# Patient Record
Sex: MALE | Race: WHITE | NOT HISPANIC OR LATINO | Employment: OTHER | URBAN - NONMETROPOLITAN AREA
[De-identification: names, ages, dates, MRNs, and addresses within clinical notes are randomized per-mention and may not be internally consistent; named-entity substitution may affect disease eponyms.]

---

## 2019-06-10 ENCOUNTER — HOSPITAL ENCOUNTER (INPATIENT)
Facility: HOSPITAL | Age: 79
LOS: 4 days | Discharge: HOSPICE | End: 2019-06-14
Attending: INTERNAL MEDICINE | Admitting: INTERNAL MEDICINE

## 2019-06-10 ENCOUNTER — APPOINTMENT (OUTPATIENT)
Dept: CT IMAGING | Facility: HOSPITAL | Age: 79
End: 2019-06-10

## 2019-06-10 DIAGNOSIS — R77.8 ELEVATED TROPONIN: ICD-10-CM

## 2019-06-10 DIAGNOSIS — J18.9 PNEUMONIA OF RIGHT LOWER LOBE DUE TO INFECTIOUS ORGANISM: Primary | ICD-10-CM

## 2019-06-10 DIAGNOSIS — R91.8 LUNG MASS: ICD-10-CM

## 2019-06-10 PROBLEM — E46 MALNUTRITION (HCC): Status: ACTIVE | Noted: 2019-06-10

## 2019-06-10 PROBLEM — C34.90 LUNG CANCER (HCC): Status: ACTIVE | Noted: 2019-06-10

## 2019-06-10 PROBLEM — R63.4 WEIGHT LOSS: Status: ACTIVE | Noted: 2019-06-10

## 2019-06-10 PROBLEM — F17.200 TOBACCO DEPENDENCE: Status: ACTIVE | Noted: 2019-06-10

## 2019-06-10 PROBLEM — E11.9 TYPE 2 DIABETES MELLITUS (HCC): Status: ACTIVE | Noted: 2019-06-10

## 2019-06-10 LAB
ALBUMIN SERPL-MCNC: 3.6 G/DL (ref 3.5–5)
ALBUMIN/GLOB SERPL: 1 G/DL (ref 1.1–2.5)
ALP SERPL-CCNC: 85 U/L (ref 24–120)
ALT SERPL W P-5'-P-CCNC: 25 U/L (ref 0–54)
ANION GAP SERPL CALCULATED.3IONS-SCNC: 9 MMOL/L (ref 4–13)
APTT PPP: 34.8 SECONDS (ref 24.1–35)
AST SERPL-CCNC: 47 U/L (ref 7–45)
BASOPHILS # BLD AUTO: 0.06 10*3/MM3 (ref 0–0.2)
BASOPHILS NFR BLD AUTO: 0.5 % (ref 0–2)
BILIRUB SERPL-MCNC: 1.4 MG/DL (ref 0.1–1)
BUN BLD-MCNC: 38 MG/DL (ref 5–21)
BUN/CREAT SERPL: 34.2 (ref 7–25)
CALCIUM SPEC-SCNC: 9.3 MG/DL (ref 8.4–10.4)
CHLORIDE SERPL-SCNC: 104 MMOL/L (ref 98–110)
CO2 SERPL-SCNC: 28 MMOL/L (ref 24–31)
CREAT BLD-MCNC: 1.11 MG/DL (ref 0.5–1.4)
DEPRECATED RDW RBC AUTO: 54.9 FL (ref 40–54)
EOSINOPHIL # BLD AUTO: 0.18 10*3/MM3 (ref 0–0.7)
EOSINOPHIL NFR BLD AUTO: 1.6 % (ref 0–4)
ERYTHROCYTE [DISTWIDTH] IN BLOOD BY AUTOMATED COUNT: 15.9 % (ref 12–15)
GFR SERPL CREATININE-BSD FRML MDRD: 64 ML/MIN/1.73
GLOBULIN UR ELPH-MCNC: 3.7 GM/DL
GLUCOSE BLD-MCNC: 118 MG/DL (ref 70–100)
GLUCOSE BLDC GLUCOMTR-MCNC: 183 MG/DL (ref 70–130)
GLUCOSE BLDC GLUCOMTR-MCNC: 186 MG/DL (ref 70–130)
HCT VFR BLD AUTO: 39.1 % (ref 40–52)
HGB BLD-MCNC: 12.6 G/DL (ref 14–18)
HOLD SPECIMEN: NORMAL
HOLD SPECIMEN: NORMAL
IMM GRANULOCYTES # BLD AUTO: 0.08 10*3/MM3 (ref 0–0.05)
IMM GRANULOCYTES NFR BLD AUTO: 0.7 % (ref 0–5)
INR PPP: 1.09 (ref 0.91–1.09)
LYMPHOCYTES # BLD AUTO: 1 10*3/MM3 (ref 0.72–4.86)
LYMPHOCYTES NFR BLD AUTO: 9.1 % (ref 15–45)
MCH RBC QN AUTO: 30.4 PG (ref 28–32)
MCHC RBC AUTO-ENTMCNC: 32.2 G/DL (ref 33–36)
MCV RBC AUTO: 94.2 FL (ref 82–95)
MONOCYTES # BLD AUTO: 0.93 10*3/MM3 (ref 0.19–1.3)
MONOCYTES NFR BLD AUTO: 8.5 % (ref 4–12)
NEUTROPHILS # BLD AUTO: 8.72 10*3/MM3 (ref 1.87–8.4)
NEUTROPHILS NFR BLD AUTO: 79.6 % (ref 39–78)
NRBC BLD AUTO-RTO: 0 /100 WBC (ref 0–0.2)
NT-PROBNP SERPL-MCNC: 1320 PG/ML (ref 0–1800)
PLATELET # BLD AUTO: 134 10*3/MM3 (ref 130–400)
PMV BLD AUTO: 12.3 FL (ref 6–12)
POTASSIUM BLD-SCNC: 4.1 MMOL/L (ref 3.5–5.3)
PROT SERPL-MCNC: 7.3 G/DL (ref 6.3–8.7)
PROTHROMBIN TIME: 14.5 SECONDS (ref 11.9–14.6)
RBC # BLD AUTO: 4.15 10*6/MM3 (ref 4.8–5.9)
SODIUM BLD-SCNC: 141 MMOL/L (ref 135–145)
TROPONIN I SERPL-MCNC: 0.4 NG/ML (ref 0–0.03)
TROPONIN I SERPL-MCNC: 0.46 NG/ML (ref 0–0.03)
WBC NRBC COR # BLD: 10.97 10*3/MM3 (ref 4.8–10.8)
WHOLE BLOOD HOLD SPECIMEN: NORMAL
WHOLE BLOOD HOLD SPECIMEN: NORMAL

## 2019-06-10 PROCEDURE — 94760 N-INVAS EAR/PLS OXIMETRY 1: CPT

## 2019-06-10 PROCEDURE — 25010000002 ENOXAPARIN PER 10 MG: Performed by: INTERNAL MEDICINE

## 2019-06-10 PROCEDURE — 93005 ELECTROCARDIOGRAM TRACING: CPT | Performed by: INTERNAL MEDICINE

## 2019-06-10 PROCEDURE — 63710000001 INSULIN LISPRO (HUMAN) PER 5 UNITS: Performed by: INTERNAL MEDICINE

## 2019-06-10 PROCEDURE — 85610 PROTHROMBIN TIME: CPT | Performed by: PHYSICIAN ASSISTANT

## 2019-06-10 PROCEDURE — 87040 BLOOD CULTURE FOR BACTERIA: CPT | Performed by: INTERNAL MEDICINE

## 2019-06-10 PROCEDURE — 82962 GLUCOSE BLOOD TEST: CPT

## 2019-06-10 PROCEDURE — 85025 COMPLETE CBC W/AUTO DIFF WBC: CPT | Performed by: PHYSICIAN ASSISTANT

## 2019-06-10 PROCEDURE — 85730 THROMBOPLASTIN TIME PARTIAL: CPT | Performed by: PHYSICIAN ASSISTANT

## 2019-06-10 PROCEDURE — 71275 CT ANGIOGRAPHY CHEST: CPT

## 2019-06-10 PROCEDURE — 94799 UNLISTED PULMONARY SVC/PX: CPT

## 2019-06-10 PROCEDURE — 0 IOPAMIDOL PER 1 ML: Performed by: PHYSICIAN ASSISTANT

## 2019-06-10 PROCEDURE — 25010000002 AZITHROMYCIN PER 500 MG: Performed by: EMERGENCY MEDICINE

## 2019-06-10 PROCEDURE — 94640 AIRWAY INHALATION TREATMENT: CPT

## 2019-06-10 PROCEDURE — 25010000002 CEFTRIAXONE PER 250 MG: Performed by: EMERGENCY MEDICINE

## 2019-06-10 PROCEDURE — 83880 ASSAY OF NATRIURETIC PEPTIDE: CPT | Performed by: PHYSICIAN ASSISTANT

## 2019-06-10 PROCEDURE — 93005 ELECTROCARDIOGRAM TRACING: CPT

## 2019-06-10 PROCEDURE — 99284 EMERGENCY DEPT VISIT MOD MDM: CPT

## 2019-06-10 PROCEDURE — 84484 ASSAY OF TROPONIN QUANT: CPT

## 2019-06-10 PROCEDURE — 80053 COMPREHEN METABOLIC PANEL: CPT | Performed by: PHYSICIAN ASSISTANT

## 2019-06-10 PROCEDURE — 93010 ELECTROCARDIOGRAM REPORT: CPT | Performed by: INTERNAL MEDICINE

## 2019-06-10 PROCEDURE — 93005 ELECTROCARDIOGRAM TRACING: CPT | Performed by: PHYSICIAN ASSISTANT

## 2019-06-10 PROCEDURE — 84484 ASSAY OF TROPONIN QUANT: CPT | Performed by: INTERNAL MEDICINE

## 2019-06-10 RX ORDER — CARVEDILOL 6.25 MG/1
12.5 TABLET ORAL 2 TIMES DAILY WITH MEALS
Status: DISCONTINUED | OUTPATIENT
Start: 2019-06-10 | End: 2019-06-10

## 2019-06-10 RX ORDER — LEVOTHYROXINE SODIUM 0.07 MG/1
75 TABLET ORAL
Status: DISCONTINUED | OUTPATIENT
Start: 2019-06-11 | End: 2019-06-14 | Stop reason: HOSPADM

## 2019-06-10 RX ORDER — DEXTROSE MONOHYDRATE 25 G/50ML
25 INJECTION, SOLUTION INTRAVENOUS
Status: DISCONTINUED | OUTPATIENT
Start: 2019-06-10 | End: 2019-06-14 | Stop reason: HOSPADM

## 2019-06-10 RX ORDER — ONDANSETRON 4 MG/1
4 TABLET, FILM COATED ORAL EVERY 6 HOURS PRN
Status: DISCONTINUED | OUTPATIENT
Start: 2019-06-10 | End: 2019-06-14 | Stop reason: HOSPADM

## 2019-06-10 RX ORDER — SODIUM CHLORIDE 0.9 % (FLUSH) 0.9 %
3 SYRINGE (ML) INJECTION EVERY 12 HOURS SCHEDULED
Status: DISCONTINUED | OUTPATIENT
Start: 2019-06-10 | End: 2019-06-14 | Stop reason: HOSPADM

## 2019-06-10 RX ORDER — ATORVASTATIN CALCIUM 80 MG/1
80 TABLET, FILM COATED ORAL DAILY
COMMUNITY
End: 2019-06-14 | Stop reason: HOSPADM

## 2019-06-10 RX ORDER — ALLOPURINOL 300 MG/1
300 TABLET ORAL DAILY
COMMUNITY
End: 2019-06-14 | Stop reason: HOSPADM

## 2019-06-10 RX ORDER — SODIUM CHLORIDE 0.9 % (FLUSH) 0.9 %
3-10 SYRINGE (ML) INJECTION AS NEEDED
Status: DISCONTINUED | OUTPATIENT
Start: 2019-06-10 | End: 2019-06-14 | Stop reason: HOSPADM

## 2019-06-10 RX ORDER — IPRATROPIUM BROMIDE AND ALBUTEROL SULFATE 2.5; .5 MG/3ML; MG/3ML
3 SOLUTION RESPIRATORY (INHALATION)
Status: DISCONTINUED | OUTPATIENT
Start: 2019-06-10 | End: 2019-06-14 | Stop reason: HOSPADM

## 2019-06-10 RX ORDER — ACETAMINOPHEN 325 MG/1
650 TABLET ORAL EVERY 4 HOURS PRN
Status: DISCONTINUED | OUTPATIENT
Start: 2019-06-10 | End: 2019-06-14 | Stop reason: HOSPADM

## 2019-06-10 RX ORDER — LEVOTHYROXINE SODIUM 0.07 MG/1
75 TABLET ORAL DAILY
COMMUNITY

## 2019-06-10 RX ORDER — ONDANSETRON 2 MG/ML
4 INJECTION INTRAMUSCULAR; INTRAVENOUS EVERY 6 HOURS PRN
Status: DISCONTINUED | OUTPATIENT
Start: 2019-06-10 | End: 2019-06-14 | Stop reason: HOSPADM

## 2019-06-10 RX ORDER — CARVEDILOL 12.5 MG/1
12.5 TABLET ORAL 2 TIMES DAILY WITH MEALS
COMMUNITY

## 2019-06-10 RX ORDER — CARVEDILOL 6.25 MG/1
12.5 TABLET ORAL 2 TIMES DAILY WITH MEALS
Status: DISCONTINUED | OUTPATIENT
Start: 2019-06-10 | End: 2019-06-14 | Stop reason: HOSPADM

## 2019-06-10 RX ORDER — NICOTINE POLACRILEX 4 MG
15 LOZENGE BUCCAL
Status: DISCONTINUED | OUTPATIENT
Start: 2019-06-10 | End: 2019-06-14 | Stop reason: HOSPADM

## 2019-06-10 RX ADMIN — IOPAMIDOL 100 ML: 755 INJECTION, SOLUTION INTRAVENOUS at 15:25

## 2019-06-10 RX ADMIN — ENOXAPARIN SODIUM 40 MG: 100 INJECTION SUBCUTANEOUS at 20:54

## 2019-06-10 RX ADMIN — IPRATROPIUM BROMIDE AND ALBUTEROL SULFATE 3 ML: 2.5; .5 SOLUTION RESPIRATORY (INHALATION) at 21:29

## 2019-06-10 RX ADMIN — AZITHROMYCIN MONOHYDRATE 500 MG: 500 INJECTION, POWDER, LYOPHILIZED, FOR SOLUTION INTRAVENOUS at 16:25

## 2019-06-10 RX ADMIN — INSULIN LISPRO 2 UNITS: 100 INJECTION, SOLUTION INTRAVENOUS; SUBCUTANEOUS at 21:00

## 2019-06-10 RX ADMIN — CARVEDILOL 12.5 MG: 6.25 TABLET, FILM COATED ORAL at 17:09

## 2019-06-10 RX ADMIN — SODIUM CHLORIDE 1 G: 900 INJECTION INTRAVENOUS at 16:06

## 2019-06-10 RX ADMIN — SODIUM CHLORIDE, PRESERVATIVE FREE 3 ML: 5 INJECTION INTRAVENOUS at 20:55

## 2019-06-10 NOTE — H&P
[facility] Medicine Services  HISTORY AND PHYSICAL    Date of Admission: 6/10/2019  Primary Care Physician: Shane Hsu MD    Subjective     Chief Complaint: weakness, shortness of breath    History of Present Illness    Mr. Becerra is a 77 yo M with a past medical history of emphysema, DM, hypothyroidism, and a history of hypertension.  Patient has had over the last 3 weeks worsening chest pressure and loss of 9 lbs.  Patient has lost >15 lbs over the last 2-3 months.  Patient reports over the last week he has had nausea, vomiting, and early satiety and decrease oral intake.  Patient had a CT scan on 6/4 from his primary care doctor that show concern for right-lower lobe malignancy (daughter has the CD with images) and concern for adrenal metastasis and hilar lymph node metastasis.    Patient has developed worsening dyspnea, shortness of breath, and fatigue recently.  He previously was able to perform most of his ADLs and function quite well but since the symptoms started to worsen 3 weeks ago he cannot ambulate 100 feet without feeling short of breath.    Patient has a >60 pack-year smoking history.         Review of Systems   Constitutional: Positive for activity change, appetite change, fatigue and unexpected weight change. Negative for chills, diaphoresis and fever.   Respiratory: Positive for shortness of breath. Negative for cough.    Cardiovascular: Negative for chest pain and palpitations.   Gastrointestinal: Negative for abdominal distention, abdominal pain, constipation, diarrhea, nausea and vomiting.   Neurological: Positive for weakness.   All other systems reviewed and are negative.       Otherwise complete ROS reviewed and negative except as mentioned in the HPI.    Past Medical History:   Past Medical History:   Diagnosis Date   • Diabetes (CMS/HCC)    • Hypertension    • Thyroid disease      Past Surgical History: Cerebral angiogram  Social History:  reports  "that he quit smoking about 4 weeks ago. His smoking use included cigarettes. He quit after 65.00 years of use. He has never used smokeless tobacco. He reports that he does not drink alcohol or use drugs.    Family History: family history includes COPD in his brother and father; Cancer in his father; Heart disease in his mother.    Allergies:  Allergies   Allergen Reactions   • Penicillins Swelling     Medications:  Prior to Admission medications    Medication Sig Start Date End Date Taking? Authorizing Provider   allopurinol (ZYLOPRIM) 300 MG tablet Take 300 mg by mouth Daily.   Yes Pablo Aldana MD   atorvastatin (LIPITOR) 80 MG tablet Take 80 mg by mouth Daily.   Yes Pablo Aldana MD   carvedilol (COREG) 12.5 MG tablet Take 12.5 mg by mouth 2 (Two) Times a Day With Meals.   Yes Pablo Aldana MD   levothyroxine (SYNTHROID, LEVOTHROID) 75 MCG tablet Take 75 mcg by mouth Daily.   Yes Pablo Aldana MD   metFORMIN (GLUCOPHAGE) 500 MG tablet Take 500 mg by mouth 2 (Two) Times a Day With Meals.   Yes Pablo Aldana MD     Objective     Vital Signs: /95   Pulse 95   Temp 98.3 °F (36.8 °C) (Oral)   Resp 25   Ht 182.9 cm (72\")   Wt 73.3 kg (161 lb 9.6 oz)   SpO2 90%   BMI 21.92 kg/m²   Physical Exam   Constitutional: He is oriented to person, place, and time. No distress.   HENT:   Head: Normocephalic and atraumatic.   Eyes: Conjunctivae are normal. No scleral icterus.   Neck: Neck supple. No JVD present.   Cardiovascular: Normal rate and regular rhythm.   Murmur heard.  Pulmonary/Chest: Effort normal. He has wheezes.   Right sided rhonchi; diminished at bilateral bases   Abdominal: Soft. Bowel sounds are normal. He exhibits no distension and no mass. There is no tenderness. There is no guarding.   Musculoskeletal: He exhibits no edema.   Neurological: He is alert and oriented to person, place, and time.   Skin: Skin is warm and dry. He is not diaphoretic. No erythema. "   Psychiatric: He has a normal mood and affect. His behavior is normal.   Nursing note and vitals reviewed.          Results Reviewed:  Lab Results (last 24 hours)     Procedure Component Value Units Date/Time    Blood Culture With WILLARD - Blood, Arm, Right [[id number]] Collected:  06/10/19 1625    Specimen:  Blood from Arm, Right Updated:  06/10/19 1652    Blood Culture With WILLARD - Blood, Arm, Left [[id number]] Collected:  06/10/19 1623    Specimen:  Blood from Arm, Left Updated:  06/10/19 1652    Comprehensive Metabolic Panel [[id number]]  (Abnormal) Collected:  06/10/19 1221    Specimen:  Blood Updated:  06/10/19 1417     Glucose 118 mg/dL      BUN 38 mg/dL      Creatinine 1.11 mg/dL      Sodium 141 mmol/L      Potassium 4.1 mmol/L      Chloride 104 mmol/L      CO2 28.0 mmol/L      Calcium 9.3 mg/dL      Total Protein 7.3 g/dL      Albumin 3.60 g/dL      ALT (SGPT) 25 U/L      AST (SGOT) 47 U/L      Alkaline Phosphatase 85 U/L      Total Bilirubin 1.4 mg/dL      eGFR Non African Amer 64 mL/min/1.73      Globulin 3.7 gm/dL      A/G Ratio 1.0 g/dL      BUN/Creatinine Ratio 34.2     Anion Gap 9.0 mmol/L     Narrative:       GFR Normal >60  Chronic Kidney Disease <60  Kidney Failure <15    CBC & Differential [[id number]] Collected:  06/10/19 1221    Specimen:  Blood Updated:  06/10/19 1356    Narrative:       The following orders were created for panel order CBC & Differential.  Procedure                               Abnormality         Status                     ---------                               -----------         ------                     CBC Auto Differential[[id number]]        Abnormal            Final result                 Please view results for these tests on the individual orders.    CBC Auto Differential [[id number]]  (Abnormal) Collected:  06/10/19 1221    Specimen:  Blood Updated:  06/10/19 1356     WBC 10.97 10*3/mm3      RBC 4.15 10*6/mm3      Hemoglobin 12.6 g/dL      Hematocrit 39.1 %      MCV 94.2 fL       MCH 30.4 pg      MCHC 32.2 g/dL      RDW 15.9 %      RDW-SD 54.9 fl      MPV 12.3 fL      Platelets 134 10*3/mm3      Neutrophil % 79.6 %      Lymphocyte % 9.1 %      Monocyte % 8.5 %      Eosinophil % 1.6 %      Basophil % 0.5 %      Immature Grans % 0.7 %      Neutrophils, Absolute 8.72 10*3/mm3      Lymphocytes, Absolute 1.00 10*3/mm3      Monocytes, Absolute 0.93 10*3/mm3      Eosinophils, Absolute 0.18 10*3/mm3      Basophils, Absolute 0.06 10*3/mm3      Immature Grans, Absolute 0.08 10*3/mm3      nRBC 0.0 /100 WBC     BNP [[id number]]  (Normal) Collected:  06/10/19 1221    Specimen:  Blood Updated:  06/10/19 1336     proBNP 1,320.0 pg/mL     Protime-INR [[id number]]  (Normal) Collected:  06/10/19 1221    Specimen:  Blood Updated:  06/10/19 1336     Protime 14.5 Seconds      INR 1.09    aPTT [[id number]]  (Normal) Collected:  06/10/19 1221    Specimen:  Blood Updated:  06/10/19 1336     PTT 34.8 seconds     [facility] Draw [[id number]] Collected:  06/10/19 1221    Specimen:  Blood Updated:  06/10/19 1331    Narrative:       The following orders were created for panel order [facility] Draw.  Procedure                               Abnormality         Status                     ---------                               -----------         ------                     Light Blue Top[[id number]]                                   Final result               Green Top (Gel)[[id number]]                                  Final result               Lavender Top[[id number]]                                     Final result               Red Top[[id number]]                                          Final result                 Please view results for these tests on the individual orders.    Light Blue Top [[id number]] Collected:  06/10/19 1221    Specimen:  Blood Updated:  06/10/19 1331     Extra Tube hold for add-on     Comment: Auto resulted       Green Top (Gel) [[id number]] Collected:  06/10/19 1221    Specimen:  Blood Updated:   06/10/19 1331     Extra Tube Hold for add-ons.     Comment: Auto resulted.       Lavender Top [[id number]] Collected:  06/10/19 1221    Specimen:  Blood Updated:  06/10/19 1331     Extra Tube hold for add-on     Comment: Auto resulted       Red Top [[id number]] Collected:  06/10/19 1221    Specimen:  Blood Updated:  06/10/19 1331     Extra Tube Hold for add-ons.     Comment: Auto resulted.       Troponin [[id number]]  (Abnormal) Collected:  06/10/19 1221    Specimen:  Blood Updated:  06/10/19 1303     Troponin I 0.402 ng/mL         Imaging Results (last 24 hours)     Procedure Component Value Units Date/Time    CT Angiogram Chest With Contrast [[id number]] Collected:  06/10/19 1537     Updated:  06/10/19 1547    Narrative:       CT ANGIOGRAM CHEST W CONTRAST- 6/10/2019 3:24 PM CDT     HISTORY: soa, cough, hx of ca, elevated troponin      COMPARISON: None     DOSE LENGTH PRODUCT: 318 mGy cm. Automated exposure control was also  utilized to decrease patient radiation dose.     TECHNIQUE: Axial images the chest are obtained following IV contrast.  2-D and maximal intensity projection images are reconstructed and  reviewed.     FINDINGS:  No pulmonary emboli are identified. There is scattered  calcification of the normal caliber thoracic aorta with no aneurysm or  dissection visualized. There is borderline cardiac Meckley. No  pericardial or pleural effusions are identified.     There is bulky mediastinal and bilateral hilar lymphadenopathy. Right  hilar lymph nodes measure up to 3.8 cm. Subcarinal lymph nodes measuring  up to 2.8 cm. Left hilar lymph nodes measuring 2.5 cm. There is no  axillary lymphadenopathy.     There are bilateral adrenal nodules, largest on the right measuring 4.5  cm. Left adrenal nodule measures 2.2 cm. There are right renal cyst.  There is a peripherally calcified slightly hyperdense right renal  lesion.     There are emphysematous changes of the lungs. There is a 1.4 cm right  middle lobe nodule  on image 91. There is nodular opacification of the  right lower lobe. There is apical lung scarring. 10 mm nodule seen in  the anterior left upper lobe on image 47. There is peripheral  honeycombing as well as chronic interstitial lung change. Posterior  pleural thickening, mild.     No focal destructive osseous lesions are visualized.       Impression:       1. No pulmonary emboli.  2. Nodular opacification the right lower lobe may represent a  combination of pneumonia and malignancy. Pathologic mediastinal and  hilar lymphadenopathy likely lymphatic metastasis. Right middle and left  upper lobe nodules raise the question of pulmonary metastasis. These  should be followed on subsequent imaging.  3. Adrenal metastasis suspected.  4. Renal cysts. A peripherally calcified hyperdense right renal lesion,  perhaps hyperdense cyst. This should be compared to any outside films.  5. Emphysema with honeycombing/chronic interstitial lung change.  This report was finalized on 06/10/2019 15:44 by Dr. Rose Hooper MD.        I have personally reviewed and interpreted the radiology studies and ECG obtained at time of admission.     Assessment / Plan     Assessment:   Active Hospital Problems    Diagnosis   • Pneumonia of right lower lobe due to infectious organism (CMS/HCC)   • Lung cancer (CMS/HCC), suspected, with metastasis to hilar lymph nodes and adrenal glands   • Type 2 diabetes mellitus (CMS/HCC)   • Weight loss   • Tobacco dependence   • Malnutrition (CMS/HCC)         Plan:   1.  Admit to medicine  2.  Strep and legionella urinary antigens, blood cultures  3.  Echocardiogram  4.  CT A/P with contrast in 48 hours since received contrast today   5.  Pulmonary consultation  6.  If cancer, likely stage 4 due to extrathoracic metastasis  7.  Ceftriaxone 2 gm and azithromycin  8.  Outpatient biopsy, likely IR guided   9.  Monitor on telemetry  10.  Trend troponins, suspect Type 2 NSTEMI and not Type 1 given clinical  picture        Code Status: DNR/DNI    POA Ginna Portillo (daughter) to make decisions if he is unable     I discussed the patient's findings and my recommendations with the patient and family    Estimated length of stay 2-4    Himanshu Grissom MD   06/10/19   6:14 PM

## 2019-06-10 NOTE — PLAN OF CARE
Problem: Patient Care Overview  Goal: Plan of Care Review  Outcome: Ongoing (interventions implemented as appropriate)   06/10/19 7189   Coping/Psychosocial   Plan of Care Reviewed With patient   Coping/Psychosocial   Patient Agreement with Plan of Care agrees   OTHER   Outcome Summary Pt admitted from ER to 4B with pneumonia. IV ABX given in ER and to start daily. Plans for echo tomorrow. Pt on 2L. Pulmonology consulted. NSR on tele. Denies pain since arrival to floor. Cont to monitor.

## 2019-06-10 NOTE — ED PROVIDER NOTES
Subjective   History of Present Illness  78-year-old male presents with a chief complaint of 2-week history of dyspnea on exertion.  The patient reports had a CT of his chest recently which revealed multiple lung masses.  He reports he had been prescribed home oxygen for at home but he has not received it yet the patient denies hemoptysis but does confirm cough.  Denies fevers nausea vomiting diarrhea  Review of Systems   All other systems reviewed and are negative.      No past medical history on file.    Allergies   Allergen Reactions   • Penicillins Swelling       No past surgical history on file.    No family history on file.    Social History     Socioeconomic History   • Marital status:      Spouse name: Not on file   • Number of children: Not on file   • Years of education: Not on file   • Highest education level: Not on file           Objective   Physical Exam   Constitutional: He is oriented to person, place, and time. He appears well-developed and well-nourished.   HENT:   Head: Normocephalic and atraumatic.   Eyes: EOM are normal.   Neck: Normal range of motion. Neck supple.   Cardiovascular: Normal rate and regular rhythm.   Pulmonary/Chest: Effort normal. He has rhonchi.   Abdominal: Soft.   Musculoskeletal: Normal range of motion.        Right lower leg: Normal.        Left lower leg: Normal.   Neurological: He is alert and oriented to person, place, and time.   Skin: Skin is warm and dry. Capillary refill takes less than 2 seconds.   Psychiatric: He has a normal mood and affect. His behavior is normal.   Nursing note and vitals reviewed.      Procedures           ED Course                  MDM  Number of Diagnoses or Management Options  Diagnosis management comments: Pneumonia, multiple areas of lung metastases and lymphadenopathy.  Will admit to hospitalist       Amount and/or Complexity of Data Reviewed  Clinical lab tests: ordered and reviewed  Tests in the radiology section of CPT®: ordered  and reviewed  Tests in the medicine section of CPT®: reviewed and ordered    Risk of Complications, Morbidity, and/or Mortality  Presenting problems: moderate  Diagnostic procedures: moderate  Management options: moderate    Patient Progress  Patient progress: stable        Final diagnoses:   Pneumonia of right lower lobe due to infectious organism (CMS/HCC)   Lung mass   Elevated troponin            Dave Cali PA-C  06/10/19 1162

## 2019-06-11 ENCOUNTER — APPOINTMENT (OUTPATIENT)
Dept: CARDIOLOGY | Facility: HOSPITAL | Age: 79
End: 2019-06-11

## 2019-06-11 PROBLEM — E44.0 MODERATE MALNUTRITION (HCC): Status: ACTIVE | Noted: 2019-06-11

## 2019-06-11 LAB
ANION GAP SERPL CALCULATED.3IONS-SCNC: 6 MMOL/L (ref 4–13)
BH CV ECHO MEAS - AO MAX PG (FULL): 2.3 MMHG
BH CV ECHO MEAS - AO MAX PG: 7 MMHG
BH CV ECHO MEAS - AO MEAN PG (FULL): 1 MMHG
BH CV ECHO MEAS - AO MEAN PG: 4 MMHG
BH CV ECHO MEAS - AO ROOT AREA (BSA CORRECTED): 1.6
BH CV ECHO MEAS - AO ROOT AREA: 8 CM^2
BH CV ECHO MEAS - AO ROOT DIAM: 3.2 CM
BH CV ECHO MEAS - AO V2 MAX: 132 CM/SEC
BH CV ECHO MEAS - AO V2 MEAN: 96.5 CM/SEC
BH CV ECHO MEAS - AO V2 VTI: 23.1 CM
BH CV ECHO MEAS - AVA(I,A): 3.7 CM^2
BH CV ECHO MEAS - AVA(I,D): 3.7 CM^2
BH CV ECHO MEAS - AVA(V,A): 4 CM^2
BH CV ECHO MEAS - AVA(V,D): 4 CM^2
BH CV ECHO MEAS - BSA(HAYCOCK): 1.9 M^2
BH CV ECHO MEAS - BSA: 1.9 M^2
BH CV ECHO MEAS - BZI_BMI: 21.8 KILOGRAMS/M^2
BH CV ECHO MEAS - BZI_METRIC_HEIGHT: 182.9 CM
BH CV ECHO MEAS - BZI_METRIC_WEIGHT: 73 KG
BH CV ECHO MEAS - EDV(CUBED): 103.2 ML
BH CV ECHO MEAS - EDV(MOD-SP4): 84.8 ML
BH CV ECHO MEAS - EDV(TEICH): 101.9 ML
BH CV ECHO MEAS - EF(CUBED): 74.9 %
BH CV ECHO MEAS - EF(MOD-SP4): 48.8 %
BH CV ECHO MEAS - EF(TEICH): 66.7 %
BH CV ECHO MEAS - ESV(CUBED): 25.9 ML
BH CV ECHO MEAS - ESV(MOD-SP4): 43.4 ML
BH CV ECHO MEAS - ESV(TEICH): 33.9 ML
BH CV ECHO MEAS - FS: 36.9 %
BH CV ECHO MEAS - IVS/LVPW: 1
BH CV ECHO MEAS - IVSD: 0.98 CM
BH CV ECHO MEAS - LA DIMENSION: 2.7 CM
BH CV ECHO MEAS - LA/AO: 0.84
BH CV ECHO MEAS - LAT PEAK E' VEL: 4.7 CM/SEC
BH CV ECHO MEAS - LV DIASTOLIC VOL/BSA (35-75): 43.6 ML/M^2
BH CV ECHO MEAS - LV MASS(C)D: 154.2 GRAMS
BH CV ECHO MEAS - LV MASS(C)DI: 79.4 GRAMS/M^2
BH CV ECHO MEAS - LV MAX PG: 4.7 MMHG
BH CV ECHO MEAS - LV MEAN PG: 3 MMHG
BH CV ECHO MEAS - LV SYSTOLIC VOL/BSA (12-30): 22.3 ML/M^2
BH CV ECHO MEAS - LV V1 MAX: 108 CM/SEC
BH CV ECHO MEAS - LV V1 MEAN: 74.4 CM/SEC
BH CV ECHO MEAS - LV V1 VTI: 17.5 CM
BH CV ECHO MEAS - LVIDD: 4.7 CM
BH CV ECHO MEAS - LVIDS: 3 CM
BH CV ECHO MEAS - LVLD AP4: 8.5 CM
BH CV ECHO MEAS - LVLS AP4: 8.3 CM
BH CV ECHO MEAS - LVOT AREA (M): 4.9 CM^2
BH CV ECHO MEAS - LVOT AREA: 4.9 CM^2
BH CV ECHO MEAS - LVOT DIAM: 2.5 CM
BH CV ECHO MEAS - LVPWD: 0.94 CM
BH CV ECHO MEAS - MED PEAK E' VEL: 3.7 CM/SEC
BH CV ECHO MEAS - MV A MAX VEL: 75.7 CM/SEC
BH CV ECHO MEAS - MV DEC TIME: 0.39 SEC
BH CV ECHO MEAS - MV E MAX VEL: 34.9 CM/SEC
BH CV ECHO MEAS - MV E/A: 0.46
BH CV ECHO MEAS - PA MAX PG: 0.41 MMHG
BH CV ECHO MEAS - PA V2 MAX: 32 CM/SEC
BH CV ECHO MEAS - RAP SYSTOLE: 5 MMHG
BH CV ECHO MEAS - RVSP: 47 MMHG
BH CV ECHO MEAS - SI(AO): 95.6 ML/M^2
BH CV ECHO MEAS - SI(CUBED): 39.8 ML/M^2
BH CV ECHO MEAS - SI(LVOT): 44.2 ML/M^2
BH CV ECHO MEAS - SI(MOD-SP4): 21.3 ML/M^2
BH CV ECHO MEAS - SI(TEICH): 35 ML/M^2
BH CV ECHO MEAS - SV(AO): 185.8 ML
BH CV ECHO MEAS - SV(CUBED): 77.2 ML
BH CV ECHO MEAS - SV(LVOT): 85.9 ML
BH CV ECHO MEAS - SV(MOD-SP4): 41.4 ML
BH CV ECHO MEAS - SV(TEICH): 68 ML
BH CV ECHO MEAS - TR MAX VEL: 324 CM/SEC
BH CV ECHO MEASUREMENTS AVERAGE E/E' RATIO: 8.31
BUN BLD-MCNC: 37 MG/DL (ref 5–21)
BUN/CREAT SERPL: 35.2 (ref 7–25)
CALCIUM SPEC-SCNC: 8.7 MG/DL (ref 8.4–10.4)
CHLORIDE SERPL-SCNC: 107 MMOL/L (ref 98–110)
CO2 SERPL-SCNC: 27 MMOL/L (ref 24–31)
CREAT BLD-MCNC: 1.05 MG/DL (ref 0.5–1.4)
DEPRECATED RDW RBC AUTO: 54.4 FL (ref 40–54)
ERYTHROCYTE [DISTWIDTH] IN BLOOD BY AUTOMATED COUNT: 15.7 % (ref 12–15)
GFR SERPL CREATININE-BSD FRML MDRD: 68 ML/MIN/1.73
GLUCOSE BLD-MCNC: 95 MG/DL (ref 70–100)
GLUCOSE BLDC GLUCOMTR-MCNC: 111 MG/DL (ref 70–130)
GLUCOSE BLDC GLUCOMTR-MCNC: 116 MG/DL (ref 70–130)
GLUCOSE BLDC GLUCOMTR-MCNC: 122 MG/DL (ref 70–130)
GLUCOSE BLDC GLUCOMTR-MCNC: 95 MG/DL (ref 70–130)
HBA1C MFR BLD: 6.3 %
HCT VFR BLD AUTO: 36 % (ref 40–52)
HGB BLD-MCNC: 11.6 G/DL (ref 14–18)
LEFT ATRIUM VOLUME INDEX: 23.3 ML/M2
LEFT ATRIUM VOLUME: 45.2 CM3
MAXIMAL PREDICTED HEART RATE: 140 BPM
MCH RBC QN AUTO: 31 PG (ref 28–32)
MCHC RBC AUTO-ENTMCNC: 32.2 G/DL (ref 33–36)
MCV RBC AUTO: 96.3 FL (ref 82–95)
PLATELET # BLD AUTO: 138 10*3/MM3 (ref 130–400)
PMV BLD AUTO: 12.2 FL (ref 6–12)
POTASSIUM BLD-SCNC: 4.1 MMOL/L (ref 3.5–5.3)
RBC # BLD AUTO: 3.74 10*6/MM3 (ref 4.8–5.9)
SODIUM BLD-SCNC: 140 MMOL/L (ref 135–145)
STRESS TARGET HR: 119 BPM
TROPONIN I SERPL-MCNC: 0.37 NG/ML (ref 0–0.03)
WBC NRBC COR # BLD: 8.78 10*3/MM3 (ref 4.8–10.8)

## 2019-06-11 PROCEDURE — 82962 GLUCOSE BLOOD TEST: CPT

## 2019-06-11 PROCEDURE — 93306 TTE W/DOPPLER COMPLETE: CPT

## 2019-06-11 PROCEDURE — 99222 1ST HOSP IP/OBS MODERATE 55: CPT | Performed by: INTERNAL MEDICINE

## 2019-06-11 PROCEDURE — 94760 N-INVAS EAR/PLS OXIMETRY 1: CPT

## 2019-06-11 PROCEDURE — 80048 BASIC METABOLIC PNL TOTAL CA: CPT | Performed by: INTERNAL MEDICINE

## 2019-06-11 PROCEDURE — 25010000002 PERFLUTREN 6.52 MG/ML SUSPENSION: Performed by: INTERNAL MEDICINE

## 2019-06-11 PROCEDURE — 94799 UNLISTED PULMONARY SVC/PX: CPT

## 2019-06-11 PROCEDURE — 25010000002 CEFTRIAXONE: Performed by: INTERNAL MEDICINE

## 2019-06-11 PROCEDURE — 87070 CULTURE OTHR SPECIMN AEROBIC: CPT | Performed by: INTERNAL MEDICINE

## 2019-06-11 PROCEDURE — 93306 TTE W/DOPPLER COMPLETE: CPT | Performed by: INTERNAL MEDICINE

## 2019-06-11 PROCEDURE — 25010000002 AZITHROMYCIN PER 500 MG: Performed by: INTERNAL MEDICINE

## 2019-06-11 PROCEDURE — 87205 SMEAR GRAM STAIN: CPT | Performed by: INTERNAL MEDICINE

## 2019-06-11 PROCEDURE — 85027 COMPLETE CBC AUTOMATED: CPT | Performed by: INTERNAL MEDICINE

## 2019-06-11 PROCEDURE — 83036 HEMOGLOBIN GLYCOSYLATED A1C: CPT | Performed by: INTERNAL MEDICINE

## 2019-06-11 PROCEDURE — 84484 ASSAY OF TROPONIN QUANT: CPT | Performed by: INTERNAL MEDICINE

## 2019-06-11 RX ADMIN — PERFLUTREN 9.78 MG: 6.52 INJECTION, SUSPENSION INTRAVENOUS at 11:31

## 2019-06-11 RX ADMIN — IPRATROPIUM BROMIDE AND ALBUTEROL SULFATE 3 ML: 2.5; .5 SOLUTION RESPIRATORY (INHALATION) at 06:48

## 2019-06-11 RX ADMIN — CARVEDILOL 12.5 MG: 6.25 TABLET, FILM COATED ORAL at 08:28

## 2019-06-11 RX ADMIN — IPRATROPIUM BROMIDE AND ALBUTEROL SULFATE 3 ML: 2.5; .5 SOLUTION RESPIRATORY (INHALATION) at 15:06

## 2019-06-11 RX ADMIN — AZITHROMYCIN MONOHYDRATE 500 MG: 500 INJECTION, POWDER, LYOPHILIZED, FOR SOLUTION INTRAVENOUS at 18:36

## 2019-06-11 RX ADMIN — CARVEDILOL 12.5 MG: 6.25 TABLET, FILM COATED ORAL at 17:32

## 2019-06-11 RX ADMIN — CEFTRIAXONE 2 G: 2 INJECTION, POWDER, FOR SOLUTION INTRAMUSCULAR; INTRAVENOUS at 17:33

## 2019-06-11 RX ADMIN — IPRATROPIUM BROMIDE AND ALBUTEROL SULFATE 3 ML: 2.5; .5 SOLUTION RESPIRATORY (INHALATION) at 20:45

## 2019-06-11 RX ADMIN — LEVOTHYROXINE SODIUM 75 MCG: 75 TABLET ORAL at 06:08

## 2019-06-11 RX ADMIN — ONDANSETRON 4 MG: 4 TABLET, FILM COATED ORAL at 08:33

## 2019-06-11 NOTE — PAYOR COMM NOTE
"6/11/19 [facility] [phone]  [phone]    PATIENT ADMITTED ON 6/10/19. INPATIENT ADMISSION.    PENDING AUTH [id number]              Nasra Persaud (78 y.o. Male)     Date of Birth Social Security Number Address Home Phone MRN    1940  [id number]  [facility] [address] [phone] [id number]    Hindu Marital Status          Advent        Admission Date Admission Type Admitting Provider Attending Provider Department, Room/Bed    6/10/19 Emergency Himanshu Grissom MD Fleming, John Eric, MD [facility] 4B, 432/1    Discharge Date Discharge Disposition Discharge Destination                       Attending Provider:  Himanshu Grissom MD    Allergies:  Penicillins    Isolation:  None   Infection:  None   Code Status:  No CPR    Ht:  182.9 cm (72\")   Wt:  73.1 kg (161 lb 3.2 oz)    Admission Cmt:  None   Principal Problem:  None                Active Insurance as of 6/10/2019     Primary Coverage     Payor Plan Insurance Group Employer/Plan Group    MEDICARE MEDICARE A & B      Payor Plan Address Payor Plan Phone Number Payor Plan Fax Number Effective Dates    [address] [phone]  9/1/2003 - None Entered    [facility] [id number]       Subscriber Name Subscriber Birth Date Member ID       NASRA PERSAUD 1940 [id number]           Secondary Coverage     Payor Plan Insurance Group Employer/Plan Group    AETNA COMMERCIAL AETNA   SUPP [id number]     Payor Plan Address Payor Plan Phone Number Payor Plan Fax Number Effective Dates    [address] [phone]  6/10/2019 - None Entered    [facility] [id number]       Subscriber Name Subscriber Birth Date Member ID       NASRA PERSAUD 1940 [id number]                 Emergency Contacts      (Rel.) Home Phone Work Phone Mobile Phone    Ginna Portillo (Daughter) -- -- [phone]    persaudAna rodarte (Daughter) -- -- [phone]    Leon Anna (Daughter) -- -- [phone]    " Ayla Sneed -- -- [phone]               History & Physical      Himanshu Grissom MD at 6/10/2019  6:14 PM              [facility] Medicine Services  HISTORY AND PHYSICAL    Date of Admission: 6/10/2019  Primary Care Physician: Shane Hsu MD    Subjective     Chief Complaint: weakness, shortness of breath    History of Present Illness    Mr. Becerra is a 77 yo M with a past medical history of emphysema, DM, hypothyroidism, and a history of hypertension.  Patient has had over the last 3 weeks worsening chest pressure and loss of 9 lbs.  Patient has lost >15 lbs over the last 2-3 months.  Patient reports over the last week he has had nausea, vomiting, and early satiety and decrease oral intake.  Patient had a CT scan on 6/4 from his primary care doctor that show concern for right-lower lobe malignancy (daughter has the CD with images) and concern for adrenal metastasis and hilar lymph node metastasis.    Patient has developed worsening dyspnea, shortness of breath, and fatigue recently.  He previously was able to perform most of his ADLs and function quite well but since the symptoms started to worsen 3 weeks ago he cannot ambulate 100 feet without feeling short of breath.    Patient has a >60 pack-year smoking history.         Review of Systems   Constitutional: Positive for activity change, appetite change, fatigue and unexpected weight change. Negative for chills, diaphoresis and fever.   Respiratory: Positive for shortness of breath. Negative for cough.    Cardiovascular: Negative for chest pain and palpitations.   Gastrointestinal: Negative for abdominal distention, abdominal pain, constipation, diarrhea, nausea and vomiting.   Neurological: Positive for weakness.   All other systems reviewed and are negative.       Otherwise complete ROS reviewed and negative except as mentioned in the HPI.    Past Medical History:   Past Medical History:   Diagnosis Date   • Diabetes  "(CMS/[facility])    • Hypertension    • Thyroid disease      Past Surgical History: Cerebral angiogram  Social History:  reports that he quit smoking about 4 weeks ago. His smoking use included cigarettes. He quit after 65.00 years of use. He has never used smokeless tobacco. He reports that he does not drink alcohol or use drugs.    Family History: family history includes COPD in his brother and father; Cancer in his father; Heart disease in his mother.    Allergies:  Allergies   Allergen Reactions   • Penicillins Swelling     Medications:  Prior to Admission medications    Medication Sig Start Date End Date Taking? Authorizing Provider   allopurinol (ZYLOPRIM) 300 MG tablet Take 300 mg by mouth Daily.   Yes Pablo Aldana MD   atorvastatin (LIPITOR) 80 MG tablet Take 80 mg by mouth Daily.   Yes Pablo Aldana MD   carvedilol (COREG) 12.5 MG tablet Take 12.5 mg by mouth 2 (Two) Times a Day With Meals.   Yes Pablo Aldana MD   levothyroxine (SYNTHROID, LEVOTHROID) 75 MCG tablet Take 75 mcg by mouth Daily.   Yes Pablo Aldana MD   metFORMIN (GLUCOPHAGE) 500 MG tablet Take 500 mg by mouth 2 (Two) Times a Day With Meals.   Yes Pablo Aldana MD     Objective     Vital Signs: /95   Pulse 95   Temp 98.3 °F (36.8 °C) (Oral)   Resp 25   Ht 182.9 cm (72\")   Wt 73.3 kg (161 lb 9.6 oz)   SpO2 90%   BMI 21.92 kg/m²    Physical Exam   Constitutional: He is oriented to person, place, and time. No distress.   HENT:   Head: Normocephalic and atraumatic.   Eyes: Conjunctivae are normal. No scleral icterus.   Neck: Neck supple. No JVD present.   Cardiovascular: Normal rate and regular rhythm.   Murmur heard.  Pulmonary/Chest: Effort normal. He has wheezes.   Right sided rhonchi; diminished at bilateral bases   Abdominal: Soft. Bowel sounds are normal. He exhibits no distension and no mass. There is no tenderness. There is no guarding.   Musculoskeletal: He exhibits no edema.   Neurological: " He is alert and oriented to person, place, and time.   Skin: Skin is warm and dry. He is not diaphoretic. No erythema.   Psychiatric: He has a normal mood and affect. His behavior is normal.   Nursing note and vitals reviewed.          Results Reviewed:  Lab Results (last 24 hours)     Procedure Component Value Units Date/Time    Blood Culture With WILLARD - Blood, Arm, Right [[id number]] Collected:  06/10/19 1625    Specimen:  Blood from Arm, Right Updated:  06/10/19 1652    Blood Culture With WILLARD - Blood, Arm, Left [[id number]] Collected:  06/10/19 1623    Specimen:  Blood from Arm, Left Updated:  06/10/19 1652    Comprehensive Metabolic Panel [[id number]]  (Abnormal) Collected:  06/10/19 1221    Specimen:  Blood Updated:  06/10/19 1417     Glucose 118 mg/dL      BUN 38 mg/dL      Creatinine 1.11 mg/dL      Sodium 141 mmol/L      Potassium 4.1 mmol/L      Chloride 104 mmol/L      CO2 28.0 mmol/L      Calcium 9.3 mg/dL      Total Protein 7.3 g/dL      Albumin 3.60 g/dL      ALT (SGPT) 25 U/L      AST (SGOT) 47 U/L      Alkaline Phosphatase 85 U/L      Total Bilirubin 1.4 mg/dL      eGFR Non African Amer 64 mL/min/1.73      Globulin 3.7 gm/dL      A/G Ratio 1.0 g/dL      BUN/Creatinine Ratio 34.2     Anion Gap 9.0 mmol/L     Narrative:       GFR Normal >60  Chronic Kidney Disease <60  Kidney Failure <15    CBC & Differential [[id number]] Collected:  06/10/19 1221    Specimen:  Blood Updated:  06/10/19 1356    Narrative:       The following orders were created for panel order CBC & Differential.  Procedure                               Abnormality         Status                     ---------                               -----------         ------                     CBC Auto Differential[[id number]]        Abnormal            Final result                 Please view results for these tests on the individual orders.    CBC Auto Differential [[id number]]  (Abnormal) Collected:  06/10/19 1221    Specimen:  Blood Updated:   06/10/19 1356     WBC 10.97 10*3/mm3      RBC 4.15 10*6/mm3      Hemoglobin 12.6 g/dL      Hematocrit 39.1 %      MCV 94.2 fL      MCH 30.4 pg      MCHC 32.2 g/dL      RDW 15.9 %      RDW-SD 54.9 fl      MPV 12.3 fL      Platelets 134 10*3/mm3      Neutrophil % 79.6 %      Lymphocyte % 9.1 %      Monocyte % 8.5 %      Eosinophil % 1.6 %      Basophil % 0.5 %      Immature Grans % 0.7 %      Neutrophils, Absolute 8.72 10*3/mm3      Lymphocytes, Absolute 1.00 10*3/mm3      Monocytes, Absolute 0.93 10*3/mm3      Eosinophils, Absolute 0.18 10*3/mm3      Basophils, Absolute 0.06 10*3/mm3      Immature Grans, Absolute 0.08 10*3/mm3      nRBC 0.0 /100 WBC     BNP [[id number]]  (Normal) Collected:  06/10/19 1221    Specimen:  Blood Updated:  06/10/19 1336     proBNP 1,320.0 pg/mL     Protime-INR [[id number]]  (Normal) Collected:  06/10/19 1221    Specimen:  Blood Updated:  06/10/19 1336     Protime 14.5 Seconds      INR 1.09    aPTT [[id number]]  (Normal) Collected:  06/10/19 1221    Specimen:  Blood Updated:  06/10/19 1336     PTT 34.8 seconds     [place] Draw [[id number]] Collected:  06/10/19 1221    Specimen:  Blood Updated:  06/10/19 1331    Narrative:       The following orders were created for panel order [place] Draw.  Procedure                               Abnormality         Status                     ---------                               -----------         ------                     Light Blue Top[[id number]]                                   Final result               Green Top (Gel)[[id number]]                                  Final result               Lavender Top[[id number]]                                     Final result               Red Top[[id number]]                                          Final result                 Please view results for these tests on the individual orders.    Light Blue Top [[id number]] Collected:  06/10/19 1221    Specimen:  Blood Updated:  06/10/19 1331     Extra Tube hold for  add-on     Comment: Auto resulted       Green Top (Gel) [[id number]] Collected:  06/10/19 1221    Specimen:  Blood Updated:  06/10/19 1331     Extra Tube Hold for add-ons.     Comment: Auto resulted.       Lavender Top [[id number]] Collected:  06/10/19 1221    Specimen:  Blood Updated:  06/10/19 1331     Extra Tube hold for add-on     Comment: Auto resulted       Red Top [[id number]] Collected:  06/10/19 1221    Specimen:  Blood Updated:  06/10/19 1331     Extra Tube Hold for add-ons.     Comment: Auto resulted.       Troponin [[id number]]  (Abnormal) Collected:  06/10/19 1221    Specimen:  Blood Updated:  06/10/19 1303     Troponin I 0.402 ng/mL         Imaging Results (last 24 hours)     Procedure Component Value Units Date/Time    CT Angiogram Chest With Contrast [[id number]] Collected:  06/10/19 1537     Updated:  06/10/19 1547    Narrative:       CT ANGIOGRAM CHEST W CONTRAST- 6/10/2019 3:24 PM CDT     HISTORY: soa, cough, hx of ca, elevated troponin      COMPARISON: None     DOSE LENGTH PRODUCT: 318 mGy cm. Automated exposure control was also  utilized to decrease patient radiation dose.     TECHNIQUE: Axial images the chest are obtained following IV contrast.  2-D and maximal intensity projection images are reconstructed and  reviewed.     FINDINGS:  No pulmonary emboli are identified. There is scattered  calcification of the normal caliber thoracic aorta with no aneurysm or  dissection visualized. There is borderline cardiac Meckley. No  pericardial or pleural effusions are identified.     There is bulky mediastinal and bilateral hilar lymphadenopathy. Right  hilar lymph nodes measure up to 3.8 cm. Subcarinal lymph nodes measuring  up to 2.8 cm. Left hilar lymph nodes measuring 2.5 cm. There is no  axillary lymphadenopathy.     There are bilateral adrenal nodules, largest on the right measuring 4.5  cm. Left adrenal nodule measures 2.2 cm. There are right renal cyst.  There is a peripherally calcified slightly  hyperdense right renal  lesion.     There are emphysematous changes of the lungs. There is a 1.4 cm right  middle lobe nodule on image 91. There is nodular opacification of the  right lower lobe. There is apical lung scarring. 10 mm nodule seen in  the anterior left upper lobe on image 47. There is peripheral  honeycombing as well as chronic interstitial lung change. Posterior  pleural thickening, mild.     No focal destructive osseous lesions are visualized.       Impression:       1. No pulmonary emboli.  2. Nodular opacification the right lower lobe may represent a  combination of pneumonia and malignancy. Pathologic mediastinal and  hilar lymphadenopathy likely lymphatic metastasis. Right middle and left  upper lobe nodules raise the question of pulmonary metastasis. These  should be followed on subsequent imaging.  3. Adrenal metastasis suspected.  4. Renal cysts. A peripherally calcified hyperdense right renal lesion,  perhaps hyperdense cyst. This should be compared to any outside films.  5. Emphysema with honeycombing/chronic interstitial lung change.  This report was finalized on 06/10/2019 15:44 by Dr. Rose Hooper MD.        I have personally reviewed and interpreted the radiology studies and ECG obtained at time of admission.     Assessment / Plan     Assessment:   Active Hospital Problems    Diagnosis   • Pneumonia of right lower lobe due to infectious organism (CMS/HCC)   • Lung cancer (CMS/HCC), suspected, with metastasis to hilar lymph nodes and adrenal glands   • Type 2 diabetes mellitus (CMS/HCC)   • Weight loss   • Tobacco dependence   • Malnutrition (CMS/HCC)         Plan:   1.  Admit to medicine  2.  Strep and legionella urinary antigens, blood cultures  3.  Echocardiogram  4.  CT A/P with contrast in 48 hours since received contrast today   5.  Pulmonary consultation  6.  If cancer, likely stage 4 due to extrathoracic metastasis  7.  Ceftriaxone 2 gm and azithromycin  8.  Outpatient biopsy,  likely IR guided   9.  Monitor on telemetry  10.  Trend troponins, suspect Type 2 NSTEMI and not Type 1 given clinical picture        Code Status: DNR/DNI    POA Ginna Portillo (daughter) to make decisions if he is unable     I discussed the patient's findings and my recommendations with the patient and family    Estimated length of stay 2-4    Himanshu Grissom MD   06/10/19   6:14 PM            Electronically signed by Himanshu Grissom MD at 6/10/2019  9:53 PM       ICU Vital Signs     Row Name 06/11/19 0730 06/11/19 0700 06/11/19 0658 06/11/19 0648 06/11/19 0522       Vitals    Temp  --  98.2 °F (36.8 °C)  --  --  98.2 °F (36.8 °C)    Temp src  --  Oral  --  --  Oral    Pulse  --  74  80  80  76    Heart Rate Source  --  Monitor  Monitor  Monitor  Monitor    Resp  --  18  18  20  22    Resp Rate Source  --  Visual  Visual  Visual  Visual    BP  --  162/95 reported  --  --  155/91    BP Location  --  Left arm  --  --  Left arm    BP Method  --  Automatic  --  --  Automatic    Patient Position  --  Lying  --  --  Lying       Oxygen Therapy    SpO2  --  90 %  --  90 %  95 % ear    Pulse Oximetry Type  --  --  --  Intermittent  Intermittent    Device (Oxygen Therapy)  nasal cannula  nasal cannula  --  nasal cannula  nasal cannula    Flow (L/min)  2  2  --  2  2    Row Name 06/10/19 2135 06/10/19 2129 06/10/19 2032 06/10/19 2000 06/10/19 1816       Height and Weight    Weight  --  --  73.1 kg (161 lb 3.2 oz)  --  --    Weight Method  --  --  Standing scale  --  --       Vitals    Temp  --  --  97.8 °F (36.6 °C)  --  97.6 °F (36.4 °C)    Temp src  --  --  Oral  --  Oral    Pulse  75  83  91  --  80    Heart Rate Source  Monitor  Monitor  Monitor  --  Monitor    Resp  --  21 22  --  24    Resp Rate Source  --  Visual  Visual  --  Visual    BP  --  --  114/50  --  134/80    BP Location  --  --  Left arm  --  Left arm    BP Method  --  --  Automatic  --  Automatic    Patient Position  --  --  Lying  --  Lying        "Oxygen Therapy    SpO2  --  90 %  92 %  --  90 %    Pulse Oximetry Type  --  Intermittent  Intermittent  --  --    Device (Oxygen Therapy)  --  nasal cannula  --  nasal cannula  nasal cannula    Flow (L/min)  --  2  --  2  2    Row Name 06/10/19 17:43:58 06/10/19 1741 06/10/19 17:40:20 06/10/19 1709 06/10/19 1643       Vitals    Temp  --  --  98.3 °F (36.8 °C)  --  --    Temp src  --  --  Oral  --  --    Pulse  --  95  --  91  86    Resp  25  --  --  --  --    BP  --  138/95  --  --  --       Oxygen Therapy    SpO2  --  --  --  90 %  91 %    Row Name 06/10/19 1623 06/10/19 1605 06/10/19 1602 06/10/19 16:00:43 06/10/19 1547       Vitals    Pulse  85  92  87  --  88    Resp  --  --  --  29  (Abnormal)   --    BP  --  171/88  --  --  --       Oxygen Therapy    SpO2  94 %  90 %  91 %  --  90 %    Row Name 06/10/19 1546 06/10/19 15:00:22 06/10/19 1417 06/10/19 1401 06/10/19 14:00:07       Vitals    Pulse  --  --  84  82  --    Resp  --  30  (Abnormal)   --  --  31  (Abnormal)     BP  170/104  (Abnormal)   --  139/99  165/102  (Abnormal)   --       Oxygen Therapy    SpO2  --  --  --  93 %  --    Row Name 06/10/19 1330 06/10/19 1317 06/10/19 1305 06/10/19 13:00:50 06/10/19 1259       Vitals    Pulse  92  84  84  --  89    Resp  --  --  --  28  --    BP  --  156/106  (Abnormal)   --  --  --       Oxygen Therapy    SpO2  97 %  --  95 %  --  96 %    Row Name 06/10/19 1246 06/10/19 1231 06/10/19 1227 06/10/19 12:19:20 06/10/19 1216       Vitals    Pulse  84  93  100  --  90    BP  150/93  166/99  --  --  162/94       Oxygen Therapy    SpO2  --  --  94 %  --  92 %    Device (Oxygen Therapy)  --  --  --  nasal cannula  --    Flow (L/min)  --  --  --  2  --    Row Name 06/10/19 1204 06/10/19 1203                Height and Weight    Height  182.9 cm (72\")  --       Weight  73.3 kg (161 lb 9.6 oz)  --       Ideal Body Weight (IBW) (kg)  82.07  --       BSA (Calculated - sq m)  1.95 sq meters  --       BMI (Calculated)  21.9  --    "    Weight in (lb) to have BMI = 25  183.9  --          Vitals    Temp  --  97.6 °F (36.4 °C)       Pulse  --  89       Resp  --  16       BP  --  159/96          Oxygen Therapy    SpO2  --  90 %           Hospital Medications (active)       Dose Frequency Start End    acetaminophen (TYLENOL) tablet 650 mg 650 mg Every 4 Hours PRN 6/10/2019     Sig - Route: Take 2 tablets by mouth Every 4 (Four) Hours As Needed for Mild Pain . - Oral    AZITHROMYCIN 500 MG/250 ML 0.9% NS IVPB (vial-mate) 500 mg Once 6/10/2019 6/10/2019    Sig - Route: Infuse 500 mg into a venous catheter 1 (One) Time. - Intravenous    AZITHROMYCIN 500 MG/250 ML 0.9% NS IVPB (vial-mate) 500 mg Every 24 Hours 6/11/2019 6/14/2019    Sig - Route: Infuse 500 mg into a venous catheter Daily. - Intravenous    carvedilol (COREG) tablet 12.5 mg 12.5 mg 2 Times Daily With Meals 6/10/2019     Sig - Route: Take 2 tablets by mouth 2 (Two) Times a Day With Meals. - Oral    Cosign for Ordering: Accepted by Himanshu Grissom MD on 6/10/2019  5:00 PM    cefTRIAXone (ROCEPHIN) 1 g/100 mL 0.9% NS (MBP) 1 g Once 6/10/2019 6/10/2019    Sig - Route: Infuse 100 mL into a venous catheter 1 (One) Time. - Intravenous    cefTRIAXone (ROCEPHIN) 2 g/100 mL 0.9% NS VTB (ADONAY) 2 g Every 24 Hours 6/11/2019 6/18/2019    Sig - Route: Infuse 100 mL into a venous catheter Daily. - Intravenous    dextrose (D50W) 25 g/ 50mL Intravenous Solution 25 g 25 g Every 15 Minutes PRN 6/10/2019     Sig - Route: Infuse 50 mL into a venous catheter Every 15 (Fifteen) Minutes As Needed for Low Blood Sugar (Blood Sugar Less Than 70). - Intravenous    dextrose (GLUTOSE) oral gel 15 g 15 g Every 15 Minutes PRN 6/10/2019     Sig - Route: Take 15 application by mouth Every 15 (Fifteen) Minutes As Needed for Low Blood Sugar (Blood sugar less than 70). - Oral    enoxaparin (LOVENOX) syringe 40 mg 40 mg Every 24 Hours 6/10/2019     Sig - Route: Inject 0.4 mL under the skin into the appropriate area as  "directed Daily. - Subcutaneous    glucagon (human recombinant) (GLUCAGEN DIAGNOSTIC) injection 1 mg 1 mg As Needed 6/10/2019     Sig - Route: Inject 1 mg under the skin into the appropriate area as directed As Needed for Low Blood Sugar (Blood Glucose Less Than 70). - Subcutaneous    insulin lispro (humaLOG) injection 2-7 Units 2-7 Units 4 Times Daily With Meals & Nightly 6/10/2019     Sig - Route: Inject 2-7 Units under the skin into the appropriate area as directed 4 (Four) Times a Day With Meals & at Bedtime. - Subcutaneous    iopamidol (ISOVUE-370) 76 % injection 100 mL 100 mL Once in Imaging 6/10/2019 6/10/2019    Sig - Route: Infuse 100 mL into a venous catheter Once. - Intravenous    ipratropium-albuterol (DUO-NEB) nebulizer solution 3 mL 3 mL 4 Times Daily - RT 6/10/2019     Sig - Route: Take 3 mL by nebulization 4 (Four) Times a Day. - Nebulization    levothyroxine (SYNTHROID, LEVOTHROID) tablet 75 mcg 75 mcg Every Early Morning 6/11/2019     Sig - Route: Take 1 tablet by mouth Every Morning. - Oral    ondansetron (ZOFRAN) injection 4 mg 4 mg Every 6 Hours PRN 6/10/2019     Sig - Route: Infuse 2 mL into a venous catheter Every 6 (Six) Hours As Needed for Nausea or Vomiting. - Intravenous    Linked Group 1:  \"Or\" Linked Group Details        ondansetron (ZOFRAN) tablet 4 mg 4 mg Every 6 Hours PRN 6/10/2019     Sig - Route: Take 1 tablet by mouth Every 6 (Six) Hours As Needed for Nausea or Vomiting. - Oral    Linked Group 1:  \"Or\" Linked Group Details        sodium chloride 0.9 % flush 3 mL 3 mL Every 12 Hours Scheduled 6/10/2019     Sig - Route: Infuse 3 mL into a venous catheter Every 12 (Twelve) Hours. - Intravenous    sodium chloride 0.9 % flush 3-10 mL 3-10 mL As Needed 6/10/2019     Sig - Route: Infuse 3-10 mL into a venous catheter As Needed for Line Care. - Intravenous    carvedilol (COREG) tablet 12.5 mg (Discontinued) 12.5 mg 2 Times Daily With Meals 6/10/2019 6/10/2019    Sig - Route: Take 2 tablets " by mouth 2 (Two) Times a Day With Meals. - Oral          Lab Results (last 48 hours)     Procedure Component Value Units Date/Time    Hemoglobin A1c [[id number]] Collected:  06/11/19 0604    Specimen:  Blood Updated:  06/11/19 0835     Hemoglobin A1C 6.3 %     Narrative:       Less than 6.0           Non-Diabetic Range  6.0-7.0                 ADA Therapeutic Target  Greater than 7.0        Action Suggested    Troponin [[id number]]  (Abnormal) Collected:  06/11/19 0604    Specimen:  Blood Updated:  06/11/19 0819     Troponin I 0.373 ng/mL     POC Glucose Once [[id number]]  (Normal) Collected:  06/11/19 0723    Specimen:  Blood Updated:  06/11/19 0808     Glucose 95 mg/dL      Comment: : [id number] Khang MuroaMeter ID: [id number]       Basic Metabolic Panel [[id number]]  (Abnormal) Collected:  06/11/19 0604    Specimen:  Blood Updated:  06/11/19 0806     Glucose 95 mg/dL      BUN 37 mg/dL      Creatinine 1.05 mg/dL      Sodium 140 mmol/L      Potassium 4.1 mmol/L      Chloride 107 mmol/L      CO2 27.0 mmol/L      Calcium 8.7 mg/dL      eGFR Non African Amer 68 mL/min/1.73      BUN/Creatinine Ratio 35.2     Anion Gap 6.0 mmol/L     Narrative:       GFR Normal >60  Chronic Kidney Disease <60  Kidney Failure <15    CBC (No Diff) [[id number]]  (Abnormal) Collected:  06/11/19 0604    Specimen:  Blood Updated:  06/11/19 0657     WBC 8.78 10*3/mm3      RBC 3.74 10*6/mm3      Hemoglobin 11.6 g/dL      Hematocrit 36.0 %      MCV 96.3 fL      MCH 31.0 pg      MCHC 32.2 g/dL      RDW 15.7 %      RDW-SD 54.4 fl      MPV 12.2 fL      Platelets 138 10*3/mm3     Blood Culture With WILLARD - Blood, Arm, Right [[id number]] Collected:  06/10/19 1625    Specimen:  Blood from Arm, Right Updated:  06/11/19 0501     Blood Culture No growth at less than 24 hours    Blood Culture With WILLARD - Blood, Arm, Left [[id number]] Collected:  06/10/19 1623    Specimen:  Blood from Arm, Left Updated:  06/11/19 0506     Blood Culture No growth at less than 24  hours    POC Glucose Once [[id number]]  (Abnormal) Collected:  06/10/19 2057    Specimen:  Blood Updated:  06/10/19 2126     Glucose 183 mg/dL      Comment: : [id number] Luciano Foote ID: [id number]       POC Glucose Once [[id number]]  (Abnormal) Collected:  06/10/19 2009    Specimen:  Blood Updated:  06/10/19 2035     Glucose 186 mg/dL      Comment: : [id number] Manjeet Andrew ID: [id number]       Troponin [[id number]]  (Abnormal) Collected:  06/10/19 1904    Specimen:  Blood Updated:  06/10/19 1955     Troponin I 0.464 ng/mL     Comprehensive Metabolic Panel [[id number]]  (Abnormal) Collected:  06/10/19 1221    Specimen:  Blood Updated:  06/10/19 1417     Glucose 118 mg/dL      BUN 38 mg/dL      Creatinine 1.11 mg/dL      Sodium 141 mmol/L      Potassium 4.1 mmol/L      Chloride 104 mmol/L      CO2 28.0 mmol/L      Calcium 9.3 mg/dL      Total Protein 7.3 g/dL      Albumin 3.60 g/dL      ALT (SGPT) 25 U/L      AST (SGOT) 47 U/L      Alkaline Phosphatase 85 U/L      Total Bilirubin 1.4 mg/dL      eGFR Non African Amer 64 mL/min/1.73      Globulin 3.7 gm/dL      A/G Ratio 1.0 g/dL      BUN/Creatinine Ratio 34.2     Anion Gap 9.0 mmol/L     Narrative:       GFR Normal >60  Chronic Kidney Disease <60  Kidney Failure <15    CBC & Differential [[id number]] Collected:  06/10/19 1221    Specimen:  Blood Updated:  06/10/19 1356    Narrative:       The following orders were created for panel order CBC & Differential.  Procedure                               Abnormality         Status                     ---------                               -----------         ------                     CBC Auto Differential[[id number]]        Abnormal            Final result                 Please view results for these tests on the individual orders.    CBC Auto Differential [[id number]]  (Abnormal) Collected:  06/10/19 1221    Specimen:  Blood Updated:  06/10/19 1356     WBC 10.97 10*3/mm3      RBC 4.15 10*6/mm3       Hemoglobin 12.6 g/dL      Hematocrit 39.1 %      MCV 94.2 fL      MCH 30.4 pg      MCHC 32.2 g/dL      RDW 15.9 %      RDW-SD 54.9 fl      MPV 12.3 fL      Platelets 134 10*3/mm3      Neutrophil % 79.6 %      Lymphocyte % 9.1 %      Monocyte % 8.5 %      Eosinophil % 1.6 %      Basophil % 0.5 %      Immature Grans % 0.7 %      Neutrophils, Absolute 8.72 10*3/mm3      Lymphocytes, Absolute 1.00 10*3/mm3      Monocytes, Absolute 0.93 10*3/mm3      Eosinophils, Absolute 0.18 10*3/mm3      Basophils, Absolute 0.06 10*3/mm3      Immature Grans, Absolute 0.08 10*3/mm3      nRBC 0.0 /100 WBC     BNP [[id number]]  (Normal) Collected:  06/10/19 1221    Specimen:  Blood Updated:  06/10/19 1336     proBNP 1,320.0 pg/mL     Protime-INR [[id number]]  (Normal) Collected:  06/10/19 1221    Specimen:  Blood Updated:  06/10/19 1336     Protime 14.5 Seconds      INR 1.09    aPTT [[id number]]  (Normal) Collected:  06/10/19 1221    Specimen:  Blood Updated:  06/10/19 1336     PTT 34.8 seconds     [place] Draw [[id number]] Collected:  06/10/19 1221    Specimen:  Blood Updated:  06/10/19 1331    Narrative:       The following orders were created for panel order [place] Draw.  Procedure                               Abnormality         Status                     ---------                               -----------         ------                     Light Blue Top[[id number]]                                   Final result               Green Top (Gel)[[id number]]                                  Final result               Lavender Top[[id number]]                                     Final result               Red Top[[id number]]                                          Final result                 Please view results for these tests on the individual orders.    Light Blue Top [[id number]] Collected:  06/10/19 1221    Specimen:  Blood Updated:  06/10/19 1331     Extra Tube hold for add-on     Comment: Auto resulted       Green Top (Gel) [[id number]]  Collected:  06/10/19 1221    Specimen:  Blood Updated:  06/10/19 1331     Extra Tube Hold for add-ons.     Comment: Auto resulted.       Lavender Top [[id number]] Collected:  06/10/19 1221    Specimen:  Blood Updated:  06/10/19 1331     Extra Tube hold for add-on     Comment: Auto resulted       Red Top [[id number]] Collected:  06/10/19 1221    Specimen:  Blood Updated:  06/10/19 1331     Extra Tube Hold for add-ons.     Comment: Auto resulted.       Troponin [[id number]]  (Abnormal) Collected:  06/10/19 1221    Specimen:  Blood Updated:  06/10/19 1303     Troponin I 0.402 ng/mL             Physician Progress Notes (last 48 hours) (Notes from 6/9/2019 11:12 AM through 6/11/2019 11:12 AM)     No notes of this type exist for this encounter.

## 2019-06-11 NOTE — PLAN OF CARE
Problem: Patient Care Overview  Goal: Plan of Care Review  Outcome: Ongoing (interventions implemented as appropriate)   06/11/19 1030   Coping/Psychosocial   Plan of Care Reviewed With patient   OTHER   Outcome Summary Pt reports that he has had a poor appetite for the last 2-3 months and has been having N/V for the last week. He reports that food does not taste good. He says that he has lost 9# in the week and >15# in the last 2-3 months. He reports a UBW of 195#, but is unsure of time frame of that weight loss. He reports he has been able to consume some of his regular diet in the hospital while receiving zofran, no po intake recorded at this time. Offered nutrition supplements, he declines at present. He would like to try eating more food while he is treated for pneumonia. Advised pt that he may benefit from supplementation at home if his appetite does not cont to improve and he keeps losing weight. Pt verbalized understanding. He has moderate muscle and fat wasting per NFPE. Malnutrition severity assessment completed and sent to MD for review. Will cont to follow and encourage intake. Advised of alternate selections.   Plan of Care Review   Progress no change       Problem: Nutrition, Imbalanced: Inadequate Oral Intake (Adult)  Goal: Identify Related Risk Factors and Signs and Symptoms  Outcome: Outcome(s) achieved Date Met: 06/11/19    Goal: Improved Oral Intake  Outcome: Ongoing (interventions implemented as appropriate)    Goal: Prevent Further Weight Loss  Outcome: Ongoing (interventions implemented as appropriate)

## 2019-06-11 NOTE — CONSULTS
PULMONARY & CRITICAL CARE CONSULT - [facility]    19, 11:10 AM  Patient Care Team:  Shane Hsu MD as PCP - General (General Practice)  Syed Blake MD as Consulting Physician (Pulmonary Disease)  Name: Jake Becerra  : 1940  MRN: [id number]  Contact Serial Number [id number]    Chief complaint: Shortness of breath  HPI:  We have been consulted by Himanshu Grissom MD to see this 78 y.o. male born on 1940.  Patient complains of shortness of breath in the for 5 week .Severity: worsening.  Aggravating factors: large meals.   Alleviating factors: medication(s) (none) Associated symptoms: fatigue, nausea, shortness of breath, vomiting, weakness and weight loss (28 lbs over 1 month). Sputum is absent.  Patient currently has a new prescription for home oxygen. Respiratory history: COPD and emphysema who has quit smoking 3 weeks ago (60+ years).  He is a retired  ([employer]).  No recent travel.  He endorses worsening shortness of breath that started about 5 months ago.  He had a CT scan done as outpatient (19 - available by disc) and was supposed to see Dr. Blake in the office on 2019 but felt that his symptoms were worsening to the point that he needed to go to the ED.  He denies fever, chills, cough, hemoptysis. He does have clubbing of the fingernails and toenails.  He does mention that he has had a decreased appetite and frequent episodes of vomiting after meals.  Troponin was elevated on arrival to the ED, EKG revealed no acute ST-T wave changes.  Troponins are trending. Cardiac echo is pending.    Patient is currently resting supine in the bed on 2L O2.  He appears chronically ill, but in no acute distress. CT Angio was reviewed.  Blood cultures are pending.  Sputum culture, Legionella and S.pneum antigens to be collected. No family is at the bedside.    Past Medical History:   has a past medical history of Diabetes  (CMS/[facility]), Hypertension, and Thyroid disease.   has no past surgical history on file.  Allergies   Allergen Reactions   • Penicillins Swelling     Medications:    azithromycin 500 mg Intravenous Q24H   carvedilol 12.5 mg Oral BID With Meals   ceftriaxone 2 g Intravenous Q24H   enoxaparin 40 mg Subcutaneous Q24H   insulin lispro 2-7 Units Subcutaneous 4x Daily With Meals & Nightly   ipratropium-albuterol 3 mL Nebulization 4x Daily - RT   levothyroxine 75 mcg Oral Q AM   sodium chloride 3 mL Intravenous Q12H        Family History:  Family History   Problem Relation Age of Onset   • Heart disease Mother    • COPD Father    • Cancer Father         lung CA with lobectomy   • COPD Brother    • COPD Brother      Social History:   reports that he quit smoking about 4 weeks ago. His smoking use included cigarettes. He quit after 65.00 years of use. He has never used smokeless tobacco. He reports that he does not drink alcohol or use drugs.  Review of Systems:  Review of Systems   Constitutional: Positive for activity change, appetite change and fatigue. Negative for chills and fever.   HENT: Negative.    Eyes: Negative.    Respiratory: Positive for apnea and shortness of breath. Negative for cough.    Cardiovascular: Negative for chest pain and leg swelling.   Gastrointestinal: Positive for nausea and vomiting. Negative for abdominal pain.   Genitourinary: Negative.    Musculoskeletal: Negative.    Skin: Negative for rash.   Neurological: Negative.    Psychiatric/Behavioral: Negative.       Physical Exam:  Temp:  [97.6 °F (36.4 °C)-98.3 °F (36.8 °C)] 98.2 °F (36.8 °C)  Heart Rate:  [] 74  Resp:  [16-31] 18  BP: (114-171)/() 162/95    Intake/Output Summary (Last 24 hours) at 6/11/2019 1110  Last data filed at 6/11/2019 0900  Gross per 24 hour   Intake 340 ml   Output 825 ml   Net -485 ml         06/10/19  1204 06/10/19  2032   Weight: 73.3 kg (161 lb 9.6 oz) 73.1 kg (161 lb 3.2 oz)     SpO2 Percentage    06/11/19  0522 06/11/19 0648 06/11/19 0700   SpO2: 95%  Comment: ear 90% 90%     Physical Exam   Constitutional: He is oriented to person, place, and time. He appears ill. No distress. Nasal cannula in place.   HENT:   Head: Normocephalic and atraumatic.   Right Ear: External ear normal.   Left Ear: External ear normal.   Nose: Nose normal.   Mouth/Throat: Oropharynx is clear and moist.   Eyes: Pupils are equal, round, and reactive to light.   Neck: Normal range of motion. Neck supple.   Cardiovascular: Normal rate and regular rhythm. Exam reveals no friction rub.   No murmur heard.  Pulmonary/Chest: Effort normal. No tachypnea. No respiratory distress. He has no decreased breath sounds. He has rales in the right lower field and the left lower field.   Abdominal: Soft. Bowel sounds are normal.   Musculoskeletal: He exhibits no edema.   Neurological: He is alert and oriented to person, place, and time.   Skin: Skin is warm and dry. No rash noted. No cyanosis or erythema. There is pallor. Nails show clubbing (toenails and fingernails).   SCDs   Psychiatric: He has a normal mood and affect. His speech is normal and behavior is normal.   Nursing note and vitals reviewed.    Results from last 7 days   Lab Units 06/11/19  0604 06/10/19  1221   WBC 10*3/mm3 8.78 10.97*   HEMOGLOBIN g/dL 11.6* 12.6*   PLATELETS 10*3/mm3 138 134     Results from last 7 days   Lab Units 06/11/19  0604 06/10/19  1221   SODIUM mmol/L 140 141   POTASSIUM mmol/L 4.1 4.1   CO2 mmol/L 27.0 28.0   BUN mg/dL 37* 38*   CREATININE mg/dL 1.05 1.11   GLUCOSE mg/dL 95 118*         Lab Results   Component Value Date    PROBNP 1,320.0 06/10/2019     Blood Culture   Date Value Ref Range Status   06/10/2019 No growth at less than 24 hours  Preliminary   06/10/2019 No growth at less than 24 hours  Preliminary     Recent radiology:   Imaging Results (last 72 hours)     Procedure Component Value Units Date/Time    CT Angiogram Chest With Contrast [[id number]] Collected:   06/10/19 1537     Updated:  06/10/19 1547    Narrative:       CT ANGIOGRAM CHEST W CONTRAST- 6/10/2019 3:24 PM CDT     HISTORY: soa, cough, hx of ca, elevated troponin      COMPARISON: None     DOSE LENGTH PRODUCT: 318 mGy cm. Automated exposure control was also  utilized to decrease patient radiation dose.     TECHNIQUE: Axial images the chest are obtained following IV contrast.  2-D and maximal intensity projection images are reconstructed and  reviewed.     FINDINGS:  No pulmonary emboli are identified. There is scattered  calcification of the normal caliber thoracic aorta with no aneurysm or  dissection visualized. There is borderline cardiac Meckley. No  pericardial or pleural effusions are identified.     There is bulky mediastinal and bilateral hilar lymphadenopathy. Right  hilar lymph nodes measure up to 3.8 cm. Subcarinal lymph nodes measuring  up to 2.8 cm. Left hilar lymph nodes measuring 2.5 cm. There is no  axillary lymphadenopathy.     There are bilateral adrenal nodules, largest on the right measuring 4.5  cm. Left adrenal nodule measures 2.2 cm. There are right renal cyst.  There is a peripherally calcified slightly hyperdense right renal  lesion.     There are emphysematous changes of the lungs. There is a 1.4 cm right  middle lobe nodule on image 91. There is nodular opacification of the  right lower lobe. There is apical lung scarring. 10 mm nodule seen in  the anterior left upper lobe on image 47. There is peripheral  honeycombing as well as chronic interstitial lung change. Posterior  pleural thickening, mild.     No focal destructive osseous lesions are visualized.       Impression:       1. No pulmonary emboli.  2. Nodular opacification the right lower lobe may represent a  combination of pneumonia and malignancy. Pathologic mediastinal and  hilar lymphadenopathy likely lymphatic metastasis. Right middle and left  upper lobe nodules raise the question of pulmonary metastasis. These  should be  followed on subsequent imaging.  3. Adrenal metastasis suspected.  4. Renal cysts. A peripherally calcified hyperdense right renal lesion,  perhaps hyperdense cyst. This should be compared to any outside films.  5. Emphysema with honeycombing/chronic interstitial lung change.  This report was finalized on 06/10/2019 15:44 by Dr. oRse Hooper MD.        My radiograph interpretation/independent review of imaging: right lower lobe nodular opacification.  Right middle and left upper lobe nodules.  Bilateral Honeycombing consistent with fbrosis.  Bullous emphysema.    Other test results (not lab or imaging):   ECHO pending    Independent review of ekg: Sinus Rhythm with sinus arrhythmia, left axis deviation, LVH. QTc 467.  HR 92    Problem List as identified by Epic (may contain historical, inactive problems)  Patient Active Problem List   Diagnosis   • Pneumonia of right lower lobe due to infectious organism (CMS/HCC)   • Lung cancer (CMS/HCC), suspected, with metastasis to hilar lymph nodes and adrenal glands   • Type 2 diabetes mellitus (CMS/HCC)   • Weight loss   • Tobacco dependence   • Malnutrition (CMS/HCC)   • Moderate malnutrition (CMS/HCC)     Pulmonary Assessment:  New problem (to me), with additional workup planned: Suspected lung cancer with metastasis, hilar lymph nodes and adrenal gland  Pneumonia right lower lobe, community acquired -  New problem (to me), no additional workup Planned:  Elevated troponin - likely type 2 NSTEMI    Other problems either stable, failing to improve or worsening:  Diabetes mellitus, type 2  Weight loss, unintentional  Malnutrition    Recommend/plan:   · Recommend biopsy of adrenal gland re: suspected adrenal metastasis  · Continue antibiotics as ordered pending results of cultures - Azithromax/Ceftriaxone  · Blood cultures and sputum cultures pending  · S.pneumo and legionella antigen pending  · Echo pending  · Oxygen therapy as needed  · Bronchodilators  · May need  nutrition consult re: malnutrition, >25lb weight loss      Thank you for this consult.  We will follow along.  Electronically signed by HUGH Sheriff Student, 06/11/19, 11:10 AM    Physician substantive contribution:  Pertinent symptoms/interval history include: The patient complains of dyspnea.  Respiratory exam shows pertinent findings of bibasilar rales on chest exam.  Plan includes: I do suspect he has a combination of COPD with emphysema and pulmonary fibrosis with a superimposed lung cancer with metastatic disease.  I would recommend needle biopsy of the right adrenal gland for diagnostic purposes.  He would be a poor candidate for bronchoscopy with endobronchial ultrasound under general anesthesia because of his overall medical status.  Pulmonary will sign off.  I have seen and examined patient personally, performing a face-to-face diagnostic evaluation with plan of care reviewed and developed with APRN and nursing staff. I have addended and/or modified the above history of present illness, physical examination, and assessment and plan to reflect my findings and impressions. Essential elements of the care plan were discussed with APRN above.  Agree with findings and assessment/plan as documented above.    Electronically signed by Syed Blake MD, on 6/11/2019, 2:19 PM

## 2019-06-11 NOTE — PLAN OF CARE
Problem: Patient Care Overview  Goal: Plan of Care Review  Outcome: Ongoing (interventions implemented as appropriate)   06/11/19 0358   Coping/Psychosocial   Plan of Care Reviewed With patient   Coping/Psychosocial   Patient Agreement with Plan of Care agrees   OTHER   Outcome Summary VSS. No complaint of pain. Up with assist of one to use the urinal. IV ABX to be given daily. Dr Blake consulted. Patient remains on 2L NC. Continue to assess and notify MD of any changes.   Plan of Care Review   Progress no change       Problem: Infection, Risk/Actual (Adult)  Goal: Identify Related Risk Factors and Signs and Symptoms  Outcome: Ongoing (interventions implemented as appropriate)   06/11/19 0358   Infection, Risk/Actual (Adult)   Related Risk Factors (Infection, Risk/Actual) chronic illness/condition   Signs and Symptoms (Infection, Risk/Actual) weakness     Goal: Infection Prevention/Resolution  Outcome: Ongoing (interventions implemented as appropriate)   06/11/19 0358   Infection, Risk/Actual (Adult)   Infection Prevention/Resolution making progress toward outcome

## 2019-06-11 NOTE — PLAN OF CARE
Problem: Patient Care Overview  Goal: Plan of Care Review  Outcome: Ongoing (interventions implemented as appropriate)   06/11/19 7910   Coping/Psychosocial   Plan of Care Reviewed With patient   OTHER   Outcome Summary VSS. Pt refusing MD wei stated it was OK to DC it. IV ABX cont. Biopsy to be done tomorrow. NPO at midnight. Lovenox DC'd. Family at bedside. Cont to monitor.    Plan of Care Review   Progress no change

## 2019-06-11 NOTE — PROGRESS NOTES
Discharge Planning Assessment  [facility]     Patient Name: Jake Becerra  MRN: [id number]  Today's Date: 6/11/2019    Admit Date: 6/10/2019    Discharge Needs Assessment     Row Name 06/11/19 1537       Living Environment    Lives With  alone    Current Living Arrangements  home/apartment/condo    Provides Primary Care For  no one    Family Caregiver if Needed  child(luna), adult    Quality of Family Relationships  helpful;involved;supportive    Able to Return to Prior Arrangements  yes       Resource/Environmental Concerns    Resource/Environmental Concerns  none    Transportation Concerns  car, none       Transition Planning    Patient/Family Anticipates Transition to  home    Patient/Family Anticipated Services at Transition  none    Transportation Anticipated  family or friend will provide       Discharge Needs Assessment    Readmission Within the Last 30 Days  no previous admission in last 30 days    Concerns to be Addressed  no discharge needs identified;denies needs/concerns at this time    Equipment Currently Used at Home  oxygen;walker, rolling;commode    Anticipated Changes Related to Illness  none    Equipment Needed After Discharge  none    Discharge Coordination/Progress  Pt has RX coverage and a PCP. Pt states that he plans on returning home at discharge to live alone. Pt states that he has 5 daughters that are very supportive. Pt states he has all DME needed including O2 but is not sure who provides O2. Pt states that he is independent and denies any needs for HH. Pt states that he has no concerns at this time. SW will follow and assist if needed.        Discharge Plan    No documentation.       Destination      No service coordination in this encounter.      Durable Medical Equipment      No service coordination in this encounter.      Dialysis/Infusion      No service coordination in this encounter.      Home Medical Care      No service coordination in this encounter.      Therapy      No service  coordination in this encounter.      Community Resources      No service coordination in this encounter.          Demographic Summary    No documentation.       Functional Status    No documentation.       Psychosocial    No documentation.       Abuse/Neglect    No documentation.       Legal    No documentation.       Substance Abuse    No documentation.       Patient Forms    No documentation.           Rody Joy

## 2019-06-11 NOTE — PROGRESS NOTES
[facility] Medicine Services  INPATIENT PROGRESS NOTE    Patient Name: Jake Becerra  Date of Admission: 6/10/2019  Today's Date: 06/11/19  Length of Stay: 1  Primary Care Physician: Shane Hsu MD    Subjective   Chief Complaint: shortness of breath  HPI     Patient seen and examined.  RN discussed with radiology, they are willing to attempt a biopsy of the right-adrenal gland. Patient willing to undergo procedure.  Patient feels some better.  Has spent most of the day in the bed.          Review of Systems   Constitutional: Negative for activity change, appetite change, chills, diaphoresis, fatigue and fever.   Respiratory: Positive for cough and shortness of breath.    Cardiovascular: Negative for chest pain and palpitations.   Gastrointestinal: Negative for abdominal distention, abdominal pain, constipation, diarrhea, nausea and vomiting.   Musculoskeletal: Negative for arthralgias and myalgias.        All pertinent negatives and positives are as above. All other systems have been reviewed and are negative unless otherwise stated.     Objective    Temp:  [97.6 °F (36.4 °C)-98.3 °F (36.8 °C)] 98 °F (36.7 °C)  Heart Rate:  [74-95] 74  Resp:  [18-30] 18  BP: (114-171)/() 141/82  Physical Exam  Constitutional: He is oriented to person, place, and time. No distress.   HENT:   Head: Normocephalic and atraumatic.   Eyes: Conjunctivae are normal. No scleral icterus.   Neck: Neck supple. No JVD present.   Cardiovascular: Normal rate and regular rhythm.   Murmur heard.  Pulmonary/Chest: Effort normal. He has wheezes.   Right sided rhonchi;   Abdominal: Soft. Bowel sounds are normal. He exhibits no distension and no mass. There is no tenderness. There is no guarding.   Musculoskeletal: He exhibits no edema.   Neurological: He is alert and oriented to person, place, and time.   Skin: Skin is warm and dry. He is not diaphoretic. No erythema.   Psychiatric: He has a normal mood  and affect. His behavior is normal.   Nursing note and vitals reviewed.          Results Review:  I have reviewed the labs, radiology results, and diagnostic studies.    Laboratory Data:   Results from last 7 days   Lab Units 06/11/19  0604 06/10/19  1221   WBC 10*3/mm3 8.78 10.97*   HEMOGLOBIN g/dL 11.6* 12.6*   HEMATOCRIT % 36.0* 39.1*   PLATELETS 10*3/mm3 138 134        Results from last 7 days   Lab Units 06/11/19  0604 06/10/19  1221   SODIUM mmol/L 140 141   POTASSIUM mmol/L 4.1 4.1   CHLORIDE mmol/L 107 104   CO2 mmol/L 27.0 28.0   BUN mg/dL 37* 38*   CREATININE mg/dL 1.05 1.11   CALCIUM mg/dL 8.7 9.3   BILIRUBIN mg/dL  --  1.4*   ALK PHOS U/L  --  85   ALT (SGPT) U/L  --  25   AST (SGOT) U/L  --  47*   GLUCOSE mg/dL 95 118*       Culture Data:   Blood Culture   Date Value Ref Range Status   06/10/2019 No growth at less than 24 hours  Preliminary   06/10/2019 No growth at less than 24 hours  Preliminary       Radiology Data:   Imaging Results (last 24 hours)     Procedure Component Value Units Date/Time    CT Angiogram Chest With Contrast [[id number]] Collected:  06/10/19 1537     Updated:  06/10/19 1547    Narrative:       CT ANGIOGRAM CHEST W CONTRAST- 6/10/2019 3:24 PM CDT     HISTORY: soa, cough, hx of ca, elevated troponin      COMPARISON: None     DOSE LENGTH PRODUCT: 318 mGy cm. Automated exposure control was also  utilized to decrease patient radiation dose.     TECHNIQUE: Axial images the chest are obtained following IV contrast.  2-D and maximal intensity projection images are reconstructed and  reviewed.     FINDINGS:  No pulmonary emboli are identified. There is scattered  calcification of the normal caliber thoracic aorta with no aneurysm or  dissection visualized. There is borderline cardiac Meckley. No  pericardial or pleural effusions are identified.     There is bulky mediastinal and bilateral hilar lymphadenopathy. Right  hilar lymph nodes measure up to 3.8 cm. Subcarinal lymph nodes  measuring  up to 2.8 cm. Left hilar lymph nodes measuring 2.5 cm. There is no  axillary lymphadenopathy.     There are bilateral adrenal nodules, largest on the right measuring 4.5  cm. Left adrenal nodule measures 2.2 cm. There are right renal cyst.  There is a peripherally calcified slightly hyperdense right renal  lesion.     There are emphysematous changes of the lungs. There is a 1.4 cm right  middle lobe nodule on image 91. There is nodular opacification of the  right lower lobe. There is apical lung scarring. 10 mm nodule seen in  the anterior left upper lobe on image 47. There is peripheral  honeycombing as well as chronic interstitial lung change. Posterior  pleural thickening, mild.     No focal destructive osseous lesions are visualized.       Impression:       1. No pulmonary emboli.  2. Nodular opacification the right lower lobe may represent a  combination of pneumonia and malignancy. Pathologic mediastinal and  hilar lymphadenopathy likely lymphatic metastasis. Right middle and left  upper lobe nodules raise the question of pulmonary metastasis. These  should be followed on subsequent imaging.  3. Adrenal metastasis suspected.  4. Renal cysts. A peripherally calcified hyperdense right renal lesion,  perhaps hyperdense cyst. This should be compared to any outside films.  5. Emphysema with honeycombing/chronic interstitial lung change.  This report was finalized on 06/10/2019 15:44 by Dr. Rose Hooper MD.          I have reviewed the patient's current medications.     Assessment/Plan     Active Hospital Problems    Diagnosis   • Moderate malnutrition (CMS/HCC)   • Pneumonia of right lower lobe due to infectious organism (CMS/HCC)   • Lung cancer (CMS/HCC), suspected, with metastasis to hilar lymph nodes and adrenal glands   • Type 2 diabetes mellitus (CMS/HCC)   • Weight loss   • Tobacco dependence       Plan:  1.  Continue azithromycin and ceftriaxone  2.  Cultures NGTD   3.  Adrenal metastatic  biopsy tomorrow per IR, NPO at MN, hold enoxaparin   4.  Pulmonary tolieting  5.  Duonebs 4X daily  6.  Ambulate patient   7.  D/C tele and pulse oximetry   8.  Strep and legionella urinary antigen pending   9.  Restart home medications as appropriate       Case discussed with bedside RN and patient.       Discharge Planning: I expect the patient to be discharged to home in 2-3 days.    Himanshu Grissom MD   06/11/19   2:31 PM

## 2019-06-11 NOTE — PROGRESS NOTES
Malnutrition Severity Assessment    Patient Name:  Jake Becerra  YOB: 1940  MRN: [id number]  Admit Date:  6/10/2019    Patient meets criteria for : Moderate (non-severe) Malnutrition    Comments:  Please attest this note if you agree with this assessment.    Malnutrition Severity Assessment  Malnutrition Type: Chronic Disease - Related Malnutrition     Malnutrition Type (last 8 hours)      Malnutrition Severity Assessment     Row Name 06/11/19 1020       Malnutrition Severity Assessment    Malnutrition Type  Chronic Disease - Related Malnutrition    Row Name 06/11/19 1020       Insufficient Energy Intake     Insufficient Energy Intake   <50% of est. energy requirement for >or equal to 1 month    Row Name 06/11/19 1020       Unintentional Weight Loss     Unintentional Weight Loss   Weight loss greater than 7.5% in three months at least 8.5% in the last 2-3 months    Row Name 06/11/19 1020       Muscle Loss    Loss of Muscle Mass Findings  Moderate    Jain Region  Moderate - slight depression    Clavicle Bone Region  Moderate - some protrusion in females, visible in males    Acromion Bone Region  Moderate - acromion may slightly protrude    Scapular Bone Region  Moderate - mild depression, bones may show slightly    Row Name 06/11/19 1020       Fat Loss    Subcutaneous Fat Loss Findings  Moderate    Orbital Region   Moderate -  somewhat hollowness, slightly dark circles    Upper Arm Region  Moderate - some fat tissue, not ample    Row Name 06/11/19 1020       Criteria Met (Must meet criteria for severity in at least 2 of these categories: M Wasting, Fat Loss, Fluid, Secondary Signs, Wt. Status, Intake)    Patient meets criteria for   Moderate (non-severe) Malnutrition          Electronically signed by:  Katherine Holden RDN, LD  06/11/19 10:30 AM

## 2019-06-12 ENCOUNTER — APPOINTMENT (OUTPATIENT)
Dept: CT IMAGING | Facility: HOSPITAL | Age: 79
End: 2019-06-12

## 2019-06-12 PROBLEM — I51.89 DIASTOLIC DYSFUNCTION: Status: ACTIVE | Noted: 2019-06-12

## 2019-06-12 PROBLEM — D64.9 ANEMIA: Status: ACTIVE | Noted: 2019-06-12

## 2019-06-12 LAB
ALBUMIN SERPL-MCNC: 3 G/DL (ref 3.5–5)
ALBUMIN/GLOB SERPL: 0.9 G/DL (ref 1.1–2.5)
ALP SERPL-CCNC: 84 U/L (ref 24–120)
ALT SERPL W P-5'-P-CCNC: 29 U/L (ref 0–54)
ANION GAP SERPL CALCULATED.3IONS-SCNC: 5 MMOL/L (ref 4–13)
APTT PPP: 33.8 SECONDS (ref 24.1–35)
AST SERPL-CCNC: 46 U/L (ref 7–45)
BILIRUB SERPL-MCNC: 0.8 MG/DL (ref 0.1–1)
BUN BLD-MCNC: 33 MG/DL (ref 5–21)
BUN/CREAT SERPL: 33.3 (ref 7–25)
CALCIUM SPEC-SCNC: 8.6 MG/DL (ref 8.4–10.4)
CHLORIDE SERPL-SCNC: 108 MMOL/L (ref 98–110)
CO2 SERPL-SCNC: 27 MMOL/L (ref 24–31)
CREAT BLD-MCNC: 0.99 MG/DL (ref 0.5–1.4)
DEPRECATED RDW RBC AUTO: 54.4 FL (ref 40–54)
ERYTHROCYTE [DISTWIDTH] IN BLOOD BY AUTOMATED COUNT: 15.6 % (ref 12–15)
GFR SERPL CREATININE-BSD FRML MDRD: 73 ML/MIN/1.73
GLOBULIN UR ELPH-MCNC: 3.2 GM/DL
GLUCOSE BLD-MCNC: 98 MG/DL (ref 70–100)
GLUCOSE BLDC GLUCOMTR-MCNC: 101 MG/DL (ref 70–130)
GLUCOSE BLDC GLUCOMTR-MCNC: 134 MG/DL (ref 70–130)
GLUCOSE BLDC GLUCOMTR-MCNC: 134 MG/DL (ref 70–130)
GLUCOSE BLDC GLUCOMTR-MCNC: 183 MG/DL (ref 70–130)
HCT VFR BLD AUTO: 35.6 % (ref 40–52)
HGB BLD-MCNC: 11.3 G/DL (ref 14–18)
INR PPP: 1.07 (ref 0.91–1.09)
L PNEUMO1 AG UR QL IA: NEGATIVE
MCH RBC QN AUTO: 30.3 PG (ref 28–32)
MCHC RBC AUTO-ENTMCNC: 31.7 G/DL (ref 33–36)
MCV RBC AUTO: 95.4 FL (ref 82–95)
PLATELET # BLD AUTO: 118 10*3/MM3 (ref 130–400)
PMV BLD AUTO: 12.6 FL (ref 6–12)
POTASSIUM BLD-SCNC: 4 MMOL/L (ref 3.5–5.3)
PROCALCITONIN SERPL-MCNC: <0.25 NG/ML
PROT SERPL-MCNC: 6.2 G/DL (ref 6.3–8.7)
PROTHROMBIN TIME: 14.2 SECONDS (ref 11.9–14.6)
RBC # BLD AUTO: 3.73 10*6/MM3 (ref 4.8–5.9)
S PNEUM AG SPEC QL LA: NEGATIVE
SODIUM BLD-SCNC: 140 MMOL/L (ref 135–145)
WBC NRBC COR # BLD: 8.62 10*3/MM3 (ref 4.8–10.8)

## 2019-06-12 PROCEDURE — 94799 UNLISTED PULMONARY SVC/PX: CPT

## 2019-06-12 PROCEDURE — 85610 PROTHROMBIN TIME: CPT | Performed by: INTERNAL MEDICINE

## 2019-06-12 PROCEDURE — 80053 COMPREHEN METABOLIC PANEL: CPT | Performed by: INTERNAL MEDICINE

## 2019-06-12 PROCEDURE — 84145 PROCALCITONIN (PCT): CPT | Performed by: INTERNAL MEDICINE

## 2019-06-12 PROCEDURE — 87899 AGENT NOS ASSAY W/OPTIC: CPT | Performed by: INTERNAL MEDICINE

## 2019-06-12 PROCEDURE — 25010000002 AZITHROMYCIN PER 500 MG: Performed by: INTERNAL MEDICINE

## 2019-06-12 PROCEDURE — 85027 COMPLETE CBC AUTOMATED: CPT | Performed by: INTERNAL MEDICINE

## 2019-06-12 PROCEDURE — 63710000001 INSULIN LISPRO (HUMAN) PER 5 UNITS: Performed by: INTERNAL MEDICINE

## 2019-06-12 PROCEDURE — 94760 N-INVAS EAR/PLS OXIMETRY 1: CPT

## 2019-06-12 PROCEDURE — 85730 THROMBOPLASTIN TIME PARTIAL: CPT | Performed by: INTERNAL MEDICINE

## 2019-06-12 PROCEDURE — 25010000002 CEFTRIAXONE PER 250 MG: Performed by: INTERNAL MEDICINE

## 2019-06-12 PROCEDURE — 99498 ADVNCD CARE PLAN ADDL 30 MIN: CPT | Performed by: CLINICAL NURSE SPECIALIST

## 2019-06-12 PROCEDURE — 25010000002 ONDANSETRON PER 1 MG: Performed by: INTERNAL MEDICINE

## 2019-06-12 PROCEDURE — 82962 GLUCOSE BLOOD TEST: CPT

## 2019-06-12 PROCEDURE — 99497 ADVNCD CARE PLAN 30 MIN: CPT | Performed by: CLINICAL NURSE SPECIALIST

## 2019-06-12 PROCEDURE — 99223 1ST HOSP IP/OBS HIGH 75: CPT | Performed by: CLINICAL NURSE SPECIALIST

## 2019-06-12 PROCEDURE — 74150 CT ABDOMEN W/O CONTRAST: CPT

## 2019-06-12 RX ORDER — HYDROCODONE BITARTRATE AND ACETAMINOPHEN 5; 325 MG/1; MG/1
1 TABLET ORAL EVERY 4 HOURS PRN
Status: DISCONTINUED | OUTPATIENT
Start: 2019-06-12 | End: 2019-06-14 | Stop reason: HOSPADM

## 2019-06-12 RX ORDER — PREDNISONE 20 MG/1
40 TABLET ORAL
Status: DISCONTINUED | OUTPATIENT
Start: 2019-06-13 | End: 2019-06-14 | Stop reason: HOSPADM

## 2019-06-12 RX ADMIN — HYDROCODONE BITARTRATE AND ACETAMINOPHEN 1 TABLET: 5; 325 TABLET ORAL at 19:07

## 2019-06-12 RX ADMIN — CARVEDILOL 12.5 MG: 6.25 TABLET, FILM COATED ORAL at 17:32

## 2019-06-12 RX ADMIN — ONDANSETRON 4 MG: 2 INJECTION INTRAMUSCULAR; INTRAVENOUS at 14:29

## 2019-06-12 RX ADMIN — LEVOTHYROXINE SODIUM 75 MCG: 75 TABLET ORAL at 06:00

## 2019-06-12 RX ADMIN — AZITHROMYCIN MONOHYDRATE 500 MG: 500 INJECTION, POWDER, LYOPHILIZED, FOR SOLUTION INTRAVENOUS at 15:32

## 2019-06-12 RX ADMIN — INSULIN LISPRO 2 UNITS: 100 INJECTION, SOLUTION INTRAVENOUS; SUBCUTANEOUS at 13:00

## 2019-06-12 RX ADMIN — IPRATROPIUM BROMIDE AND ALBUTEROL SULFATE 3 ML: 2.5; .5 SOLUTION RESPIRATORY (INHALATION) at 07:40

## 2019-06-12 RX ADMIN — CEFTRIAXONE 2 G: 2 INJECTION, POWDER, FOR SOLUTION INTRAMUSCULAR; INTRAVENOUS at 15:33

## 2019-06-12 RX ADMIN — IPRATROPIUM BROMIDE AND ALBUTEROL SULFATE 3 ML: 2.5; .5 SOLUTION RESPIRATORY (INHALATION) at 19:56

## 2019-06-12 RX ADMIN — CARVEDILOL 12.5 MG: 6.25 TABLET, FILM COATED ORAL at 08:09

## 2019-06-12 RX ADMIN — SODIUM CHLORIDE, PRESERVATIVE FREE 3 ML: 5 INJECTION INTRAVENOUS at 08:10

## 2019-06-12 RX ADMIN — SODIUM CHLORIDE, PRESERVATIVE FREE 3 ML: 5 INJECTION INTRAVENOUS at 20:25

## 2019-06-12 RX ADMIN — IPRATROPIUM BROMIDE AND ALBUTEROL SULFATE 3 ML: 2.5; .5 SOLUTION RESPIRATORY (INHALATION) at 14:41

## 2019-06-12 NOTE — PROGRESS NOTES
"    [facility] Medicine Services  INPATIENT PROGRESS NOTE    Patient Name: Jake Becerra  Date of Admission: 6/10/2019  Today's Date: 06/12/19  Length of Stay: 2  Primary Care Physician: Shane Hsu MD    Subjective   Chief Complaint: \"Feeling some better\"  HPI       Patient was seen and examined at bedside.  Patient indicates that he is feeling some better.  They were unable to perform the biopsy this morning, as they could not find a window did not pass through the base of the lung.  Patient indicates that he would like to go home with hospice.  We will continue the treatment for his pneumonia.        Review of Systems   Constitutional: Negative for chills and fever.   Respiratory: Positive for cough and shortness of breath.    Cardiovascular: Negative for chest pain and palpitations.   Gastrointestinal: Negative for abdominal distention, abdominal pain, constipation, diarrhea, nausea and vomiting.   Musculoskeletal: Negative for arthralgias and myalgias.   Skin: Negative for pallor, rash and wound.        All pertinent negatives and positives are as above. All other systems have been reviewed and are negative unless otherwise stated.     Objective    Temp:  [97.5 °F (36.4 °C)-98.2 °F (36.8 °C)] 98.1 °F (36.7 °C)  Heart Rate:  [55-88] 88  Resp:  [16-18] 18  BP: (125-155)/(62-88) 149/62  Physical Exam  Constitutional: He is oriented to person, place, and time. No distress.   HENT:   Head: Normocephalic and atraumatic.   Eyes: Conjunctivae are normal. No scleral icterus.   Neck: Neck supple. No JVD present.   Cardiovascular: Normal rate and regular rhythm.   Murmur heard.  Pulmonary/Chest: Effort normal. He has wheezes.   Right sided rhonchi;   Abdominal: Soft. Bowel sounds are normal. He exhibits no distension and no mass. There is no tenderness. There is no guarding.   Musculoskeletal: He exhibits no edema.   Neurological: He is alert and oriented to person, place, and time. "   Skin: Skin is warm and dry. He is not diaphoretic. No erythema.   Psychiatric: He has a normal mood and affect. His behavior is normal.   Nursing note and vitals reviewed.        Results Review:  I have reviewed the labs, radiology results, and diagnostic studies.    Laboratory Data:   Results from last 7 days   Lab Units 06/12/19 0319 06/11/19  0604 06/10/19  1221   WBC 10*3/mm3 8.62 8.78 10.97*   HEMOGLOBIN g/dL 11.3* 11.6* 12.6*   HEMATOCRIT % 35.6* 36.0* 39.1*   PLATELETS 10*3/mm3 118* 138 134        Results from last 7 days   Lab Units 06/12/19 0319 06/11/19  0604 06/10/19  1221   SODIUM mmol/L 140 140 141   POTASSIUM mmol/L 4.0 4.1 4.1   CHLORIDE mmol/L 108 107 104   CO2 mmol/L 27.0 27.0 28.0   BUN mg/dL 33* 37* 38*   CREATININE mg/dL 0.99 1.05 1.11   CALCIUM mg/dL 8.6 8.7 9.3   BILIRUBIN mg/dL 0.8  --  1.4*   ALK PHOS U/L 84  --  85   ALT (SGPT) U/L 29  --  25   AST (SGOT) U/L 46*  --  47*   GLUCOSE mg/dL 98 95 118*       Culture Data:   Blood Culture   Date Value Ref Range Status   06/10/2019 No growth at 24 hours  Preliminary   06/10/2019 No growth at 24 hours  Preliminary     Respiratory Culture   Date Value Ref Range Status   06/11/2019 Light growth (2+) Normal Respiratory Joan  Preliminary       Radiology Data:   Imaging Results (last 24 hours)     Procedure Component Value Units Date/Time    CT Abdomen Without Contrast [[id number]] Updated:  06/12/19 1147          I have reviewed the patient's current medications.     Assessment/Plan     Active Hospital Problems    Diagnosis   • Moderate malnutrition (CMS/HCC)   • Pneumonia of right lower lobe due to infectious organism (CMS/HCC)   • Lung cancer (CMS/HCC), suspected, with metastasis to hilar lymph nodes and adrenal glands   • Type 2 diabetes mellitus (CMS/HCC)   • Weight loss   • Tobacco dependence       Plan:  1.  Palliative care consultation   2.  Continue azithromycin and ceftriaxone  3.  Pulmonary tolieting  4.  Duonebs scheduled  5.  Start  prednisone 40 mg   6.  Ambulate patient  7.  Discussed with patient goals of care, he would like his pneumonia treated and then go home and think about what if anything he would like to do   8.  Case discussed with [facility] palliative care.  Hydrocodone 5 mg PRN for air hunger and pain   9.  Strep and legionella urinary antigen were negative  10.  AM lab holiday       Case discussed with patient and family and bedside RN.          Discharge Planning: I expect the patient to be discharged to ? in ? days.    Himanshu Grissom MD   06/12/19   12:46 PM

## 2019-06-12 NOTE — PLAN OF CARE
Problem: Patient Care Overview  Goal: Plan of Care Review  Outcome: Ongoing (interventions implemented as appropriate)   06/12/19 1503   Coping/Psychosocial   Plan of Care Reviewed With patient   OTHER   Outcome Summary UNABLE TO TO DO NEEDLE BIOPSY THIS MORNING. PALLIATIVE CARE CONSULT. NO C/O PAIN. VSS. CONTINUE TO MONITOR.   Plan of Care Review   Progress no change     Goal: Individualization and Mutuality  Outcome: Ongoing (interventions implemented as appropriate)    Goal: Discharge Needs Assessment  Outcome: Ongoing (interventions implemented as appropriate)    Goal: Interprofessional Rounds/Family Conf  Outcome: Ongoing (interventions implemented as appropriate)      Problem: Infection, Risk/Actual (Adult)  Goal: Identify Related Risk Factors and Signs and Symptoms  Outcome: Ongoing (interventions implemented as appropriate)    Goal: Infection Prevention/Resolution  Outcome: Ongoing (interventions implemented as appropriate)      Problem: Nutrition, Imbalanced: Inadequate Oral Intake (Adult)  Goal: Improved Oral Intake  Outcome: Ongoing (interventions implemented as appropriate)    Goal: Prevent Further Weight Loss  Outcome: Ongoing (interventions implemented as appropriate)      Problem: Palliative Care (Adult)  Goal: Identify Related Risk Factors and Signs and Symptoms  Outcome: Ongoing (interventions implemented as appropriate)    Goal: Maximized Comfort  Outcome: Ongoing (interventions implemented as appropriate)    Goal: Enhanced Quality of Life  Outcome: Ongoing (interventions implemented as appropriate)

## 2019-06-12 NOTE — CONSULTS
Palliative Care Initial Consult   Attending Physician: Himanshu Grissom MD  Referring Provider: Himanshu Grissom MD    Reason for Referral: assistance with clarification of goals of care and Comfort care, and support for patient and family  Family/Support: daughters x 2 at bedside  Goals of Care: TBD.  Code Status and Medical Interventions:   Ordered at: 06/10/19 1829     Limited Support to NOT Include:    Intubation     Level Of Support Discussed With:    Patient     Code Status:    No CPR     Medical Interventions (Level of Support Prior to Arrest):    Limited         HPI:   78 y.o. male with past medical history significant for COPD-emphysema, diabetes mellitus type 2, hypothyroidism, tobacco dependence 60+ years-recently quit 3-4 weeks ago, impaired mobility-uses rolling walker, and hypertension.  Recent CT on June 4 with concern for right lower lobe malignancy and concern for right hilar lymph node and adrenal metastasis. Patient presented to [facility] on 6/10/2019 related to shortness of breath and fatigue.  Patient report greater than 25 pound weight loss over the last few months.  CT of chest revealed nodular opacification of the right lower lobe may represent pneumonia and malignancy.  Pathologic mediastinal and hilar lymphadenopathy likely lymphatic metastasis.  Right middle and left upper lobe nodules (1.4 cm right middle lobe nodule, there is nodular opacification of the right lower lob, 10 mm nodule seen in the anterior left upper lobe) concerning for pulmonary metastasis.  Adrenal metastasis suspected.  Emphysema with honeycombing\chronic interstitial lung change, consistent with fibrosis per Dr. Pride pulmonology.  Adrenal biopsy was attempted but unable to be performed due to interpositional lung bases.  A CT of the abdomen revealed questionable hypodense small liver lesions. Palliative Care Spoke With: patient and family reports quality of life and functional status have declined  "significantly over the last 1 and half to 2 months.  Patient reports a unintentional weight loss of approximately 30 pounds. Due to the Palliative Care Topics Discussed: palliative care, goals of care, care options, resuscitation status, [facility] and discharge options we will establish an advance care plan.   Advance Care Planning   Advance Care Planning Discussion: Patient and family able to verbalize a very good understanding of patient's current health status with recent findings of presumed metastatic lung cancer, multiple co-morbidities, and advanced age.  Based upon this discussion we further assessed care goals moving forward and discussed discharge options to include home with hospice versus nursing facility.  We reviewed hospice role and end-of-life care.  We additionally reviewed medications to assist with symptom management including low-dose opioid therapy.  Patient and family agreeable to trial of low-dose opioid with hopes to improve air hunger. Patient and family have a very good insight/knowledge of care options available. Patient and family have elected to transition home with hospice and not pursue overly aggressive care measures or treatments.  Attempts to obtain adrenal biopsy were unsuccessful today.  Patient reports that \"I have had a good life and I am ready when the good Lord is ready to take me\".  Patient has a very good support system in place and family plan to provide 24\7 caregiver support as needed.  Additionally patient has a strong spiritual support system in place.  We explored patient's current advance directive status. We reviewed purpose and goals for advance care planning. I reviewed with Jake qualities to consider when choosing a healthcare agent. Jake had selected daughters, Ginna Portillo and Ana Becerra as his healthcare agents. I encouraged Jake to discuss his preferences for future care with healthcare agents and others close to him. Jake describes past experiences " dealing with friends or family who have been seriously ill. Jake describes cultural, Synagogue, spiritual or personal practices/beliefs that are important to him or might help him choose the care wanted, or not wanted. Goals of medical care for severe, permanent brain injury were explored. Each section of the advance care/living will document was reviewed and discussed. Advance care/living will document finished during this facilitation.  Based upon decisions for transition home with hospice we further reviewed and initiated medical orders for scope of treatment (MOST) form in line with goals of care to include no CPR, limited interventions-no intubation or no BiPAP, and no feeding tube.  Questions encouraged and support given.    Review of Systems   Constitution: Positive for decreased appetite, weakness, malaise/fatigue and weight loss.   Cardiovascular: Negative for leg swelling.   Respiratory: Positive for cough and shortness of breath.    Gastrointestinal: Positive for nausea. Negative for constipation and vomiting.        Uses FiberCon regularly 3 times weekly.   Psychiatric/Behavioral: The patient is not nervous/anxious.    All other systems reviewed and are negative.      Past Medical History:   Diagnosis Date   • Diabetes (CMS/HCC)    • Hypertension    • Thyroid disease      History reviewed. No pertinent surgical history.  Social History     Socioeconomic History   • Marital status:      Spouse name: Not on file   • Number of children: Not on file   • Years of education: Not on file   • Highest education level: Not on file   Occupational History   • Occupation:      Comment: [employer]   • Occupation:      Comment: MANAS WA[employer]   Tobacco Use   • Smoking status: Former Smoker     Years: 65.00     Types: Cigarettes     Last attempt to quit: 5/10/2019     Years since quittin.0   • Smokeless tobacco: Never Used   • Tobacco comment: Smoked for 60 years   Substance and  "Sexual Activity   • Alcohol use: No     Frequency: Monthly or less   • Drug use: No       Allergies   Allergen Reactions   • Penicillins Swelling       Current medication reviewed for route, type, dose and frequency and are current per MAR at time of dictation.      Diagnostics: Reviewed    Patient Active Problem List   Diagnosis   • Pneumonia of right lower lobe due to infectious organism (CMS/HCC)   • Lung cancer (CMS/HCC), suspected, with metastasis to hilar lymph nodes and adrenal glands   • Type 2 diabetes mellitus (CMS/HCC)   • Weight loss   • Tobacco dependence   • Malnutrition (CMS/HCC)   • Moderate malnutrition (CMS/HCC)       Physical Exam:    /62 (BP Location: Right arm, Patient Position: Lying)   Pulse 88   Temp 98.1 °F (36.7 °C) (Oral)   Resp 18   Ht 182.9 cm (72\")   Wt 73 kg (161 lb)   SpO2 93%   BMI 21.84 kg/m²     Physical Exam   Constitutional: He is oriented to person, place, and time. He appears well-developed. He appears cachectic. He appears ill. No distress. Nasal cannula in place.   HENT:   Head: Normocephalic and atraumatic.   Eyes: Pupils are equal, round, and reactive to light.   Neck: Normal range of motion.   Cardiovascular: Normal rate, regular rhythm and intact distal pulses.   Murmur heard.  Pulmonary/Chest: Accessory muscle usage present. No respiratory distress. He has decreased breath sounds. He has rales.   Abdominal: Soft. Bowel sounds are normal.   Musculoskeletal: Normal range of motion.   Neurological: He is alert and oriented to person, place, and time.   Skin: Skin is warm and dry.   Psychiatric: He has a normal mood and affect. His speech is normal and behavior is normal. Judgment and thought content normal. Cognition and memory are normal.         Patient status: Disease state: Controlled with current treatments.  Functional status: Palliative Performance Scale Score: Performance 40% based on the following measures: Ambulation: Mainly in bed, Activity and " Evidence of Disease: Unable to do any work, extensive evidence of disease, Self-Care: Mainly assistance required,  Intake: Normal or reduced, LOC: Full, drowsy or confusion   ECOG Status(3) Capable of limited self-care, confined to bed or chair > 50% of waking hours.  Nutritional status: Albumin 3.0, greater than 25 pound weight loss, unintentional. Body mass index is 21.84 kg/m².         Family support: The patient receives support from his children..  POA/Healthcare surrogate-advance directive completed today    Impression/Problem List:    1.  COPD with emphysema  2.  Pulmonary fibrosis  3.  Suspected lung cancer with metastasis to hilar lymph nodes and bilateral adrenal glands ? liver lesions per CT abd., unable to complete adrenal biopsy.  4.  Pneumonia in the right lower lobe  5.  Unintentional weight loss, 30 pounds over last 1-1/2 to 2 months  6.  Impaired mobility  7.  Tobacco dependence  8.  Diabetes mellitus, type II    Recommendations/Plan:  1. plan: Goals of care include CODE STATUS no CPR\limited interventions.     2.  Palliative care encounter  -CODE STATUS changes as delineated per detailed conversation above  -Reviewed and initiated medical orders for scope of treatment (MOST) form to further delineate care goals after discharge.  -Completed advanced directive  -Patient and family have elected to transition home with hospice.  Patient will have access to 24\7 caregiver assistance if needed.  Discussed with nurse and .    3.  Dyspnea  -Recommend add low-dose opioid hydrocodone\acetaminophen 5\325 mg every 4 hours as needed for air hunger, respirations greater than 24 breaths/min in patient with advanced COPD, pulmonary fibrosis, and lung cancer.  -antibiotics per attending-pneumonia    4.  Constipation  -Takes regular bowel regimen at home.  Start MiraLAX daily    5.  Nausea  -Controlled with Zofran.  May be multifactorial related to decreased gastric emptying.  Recommend regular bowel  regimen as above.  May consider addition of Reglan 5 mg by mouth before meals 3 times daily.         Thank you for this consult and allowing us to participate in patient's plan of care. Palliative Care Team will continue to follow patient.     Time spent: 120 minutes spent reviewing medical and medication records, assessing and examining patient, discussing with family, answering questions, providing some guidance about a plan and documentation of care, and coordinating care with other healthcare members, with > 50% time spent face to face.   65 minutes spent on advance care planning.    Suad Grayson, APRN  6/12/2019

## 2019-06-12 NOTE — PROGRESS NOTES
Continued Stay Note   Luis Felipe     Patient Name: Jake Becerra  MRN: [id number]  Today's Date: 6/12/2019    Admit Date: 6/10/2019    Discharge Plan     Row Name 06/12/19 1542       Plan    Plan  Home with Hospice    Patient/Family in Agreement with Plan  yes    Plan Comments  Referral for hospice.  SW spoke to pt and daughter and provided Hospice options, [facility] or [facility].  They want to speak with family before making a decision.  SW will check back with them tomorrow.  DEEPA Isaac.         Discharge Codes    No documentation.             DEEPA Macdonald

## 2019-06-12 NOTE — PLAN OF CARE
Problem: Patient Care Overview  Goal: Plan of Care Review  Outcome: Ongoing (interventions implemented as appropriate)   06/12/19 0446   Coping/Psychosocial   Plan of Care Reviewed With patient   Coping/Psychosocial   Patient Agreement with Plan of Care agrees   OTHER   Outcome Summary VSS. No complaint of pain. Tele DC'd. Patient to have needle biopsy this morning. consent signed and on the chart. Will continue to assess and notify MD of any changes.   Plan of Care Review   Progress no change       Problem: Infection, Risk/Actual (Adult)  Goal: Identify Related Risk Factors and Signs and Symptoms  Outcome: Ongoing (interventions implemented as appropriate)   06/12/19 0446   Infection, Risk/Actual (Adult)   Related Risk Factors (Infection, Risk/Actual) chronic illness/condition   Signs and Symptoms (Infection, Risk/Actual) weakness     Goal: Infection Prevention/Resolution  Outcome: Ongoing (interventions implemented as appropriate)   06/12/19 0446   Infection, Risk/Actual (Adult)   Infection Prevention/Resolution making progress toward outcome       Problem: Nutrition, Imbalanced: Inadequate Oral Intake (Adult)  Goal: Improved Oral Intake  Outcome: Ongoing (interventions implemented as appropriate)   06/12/19 0446   Nutrition, Imbalanced: Inadequate Oral Intake (Adult)   Improved Oral Intake making progress toward outcome     Goal: Prevent Further Weight Loss  Outcome: Ongoing (interventions implemented as appropriate)   06/12/19 0446   Nutrition, Imbalanced: Inadequate Oral Intake (Adult)   Prevent Further Weight Loss making progress toward outcome

## 2019-06-13 PROBLEM — J96.01 ACUTE RESPIRATORY FAILURE WITH HYPOXIA (HCC): Status: ACTIVE | Noted: 2019-06-13

## 2019-06-13 LAB
BACTERIA SPEC RESP CULT: NORMAL
GLUCOSE BLDC GLUCOMTR-MCNC: 121 MG/DL (ref 70–130)
GLUCOSE BLDC GLUCOMTR-MCNC: 261 MG/DL (ref 70–130)
GLUCOSE BLDC GLUCOMTR-MCNC: 96 MG/DL (ref 70–130)
GRAM STN SPEC: NORMAL

## 2019-06-13 PROCEDURE — 94799 UNLISTED PULMONARY SVC/PX: CPT

## 2019-06-13 PROCEDURE — 94760 N-INVAS EAR/PLS OXIMETRY 1: CPT

## 2019-06-13 PROCEDURE — 63710000001 PREDNISONE PER 1 MG: Performed by: INTERNAL MEDICINE

## 2019-06-13 PROCEDURE — 82962 GLUCOSE BLOOD TEST: CPT

## 2019-06-13 PROCEDURE — 99233 SBSQ HOSP IP/OBS HIGH 50: CPT | Performed by: CLINICAL NURSE SPECIALIST

## 2019-06-13 PROCEDURE — 25010000002 FUROSEMIDE PER 20 MG: Performed by: INTERNAL MEDICINE

## 2019-06-13 PROCEDURE — 25010000002 CEFTRIAXONE PER 250 MG: Performed by: INTERNAL MEDICINE

## 2019-06-13 PROCEDURE — 25010000002 AZITHROMYCIN PER 500 MG: Performed by: INTERNAL MEDICINE

## 2019-06-13 RX ORDER — ALPRAZOLAM 0.25 MG/1
0.25 TABLET ORAL 2 TIMES DAILY PRN
Status: DISCONTINUED | OUTPATIENT
Start: 2019-06-13 | End: 2019-06-14 | Stop reason: HOSPADM

## 2019-06-13 RX ORDER — FUROSEMIDE 10 MG/ML
20 INJECTION INTRAMUSCULAR; INTRAVENOUS ONCE
Status: COMPLETED | OUTPATIENT
Start: 2019-06-13 | End: 2019-06-13

## 2019-06-13 RX ADMIN — IPRATROPIUM BROMIDE AND ALBUTEROL SULFATE 3 ML: 2.5; .5 SOLUTION RESPIRATORY (INHALATION) at 15:51

## 2019-06-13 RX ADMIN — IPRATROPIUM BROMIDE AND ALBUTEROL SULFATE 3 ML: 2.5; .5 SOLUTION RESPIRATORY (INHALATION) at 22:16

## 2019-06-13 RX ADMIN — HYDROCODONE BITARTRATE AND ACETAMINOPHEN 1 TABLET: 5; 325 TABLET ORAL at 12:51

## 2019-06-13 RX ADMIN — LEVOTHYROXINE SODIUM 75 MCG: 75 TABLET ORAL at 06:04

## 2019-06-13 RX ADMIN — PREDNISONE 40 MG: 20 TABLET ORAL at 09:09

## 2019-06-13 RX ADMIN — CARVEDILOL 12.5 MG: 6.25 TABLET, FILM COATED ORAL at 18:48

## 2019-06-13 RX ADMIN — SODIUM CHLORIDE, PRESERVATIVE FREE 3 ML: 5 INJECTION INTRAVENOUS at 20:20

## 2019-06-13 RX ADMIN — SODIUM CHLORIDE, PRESERVATIVE FREE 3 ML: 5 INJECTION INTRAVENOUS at 09:13

## 2019-06-13 RX ADMIN — IPRATROPIUM BROMIDE AND ALBUTEROL SULFATE 3 ML: 2.5; .5 SOLUTION RESPIRATORY (INHALATION) at 07:39

## 2019-06-13 RX ADMIN — IPRATROPIUM BROMIDE AND ALBUTEROL SULFATE 3 ML: 2.5; .5 SOLUTION RESPIRATORY (INHALATION) at 11:38

## 2019-06-13 RX ADMIN — FUROSEMIDE 20 MG: 10 INJECTION, SOLUTION INTRAVENOUS at 16:49

## 2019-06-13 RX ADMIN — HYDROCODONE BITARTRATE AND ACETAMINOPHEN 1 TABLET: 5; 325 TABLET ORAL at 20:21

## 2019-06-13 RX ADMIN — CARVEDILOL 12.5 MG: 6.25 TABLET, FILM COATED ORAL at 09:09

## 2019-06-13 RX ADMIN — AZITHROMYCIN MONOHYDRATE 500 MG: 500 INJECTION, POWDER, LYOPHILIZED, FOR SOLUTION INTRAVENOUS at 18:29

## 2019-06-13 NOTE — PROGRESS NOTES
Continued Stay Note  RAKESH Briscoe     Patient Name: Jake Becerra  MRN: [id number]  Today's Date: 6/13/2019    Admit Date: 6/10/2019    Discharge Plan     Row Name 06/13/19 1149       Plan    Plan  Home with [facility]    Patient/Family in Agreement with Plan  yes    Plan Comments  Pt decided on [facility].  ENMANUEL has faxed referral and spoken to Erin to inform of referral.  Possible dc date tomorrow, 6/14.  ENMANUEL will follow.  DEEPA Isaac.         Discharge Codes    No documentation.             DEEPA Macdonald

## 2019-06-13 NOTE — DISCHARGE PLACEMENT REQUEST
"From:  No Hernandez, BSW  [phone]    Please contact pt's daughterAna at [phone]    [place] tomorrow.      PersaudNasra (78 y.o. Male)     Date of Birth Social Security Number Address Home Phone MRN    [id number]  [id number]  [facility] [phone] [id number]    [employer] Marital Status          [employer]        Admission Date Admission Type Admitting Provider Attending Provider Department, Room/Bed    6/10/19 Emergency Himanshu Grissom MD Fleming, John Eric, MD [facility] 4B, 432/1    Discharge Date Discharge Disposition Discharge Destination                       Attending Provider:  Himanshu Grissom MD    Allergies:  Penicillins    Isolation:  None   Infection:  None   Code Status:  No CPR    Ht:  182.9 cm (72\")   Wt:  76.1 kg (167 lb 11.2 oz)    Admission Cmt:  None   Principal Problem:  None                Active Insurance as of 6/10/2019     Primary Coverage     Payor Plan Insurance Group Employer/Plan Group    MEDICARE MEDICARE A & B      Payor Plan Address Payor Plan Phone Number Payor Plan Fax Number Effective Dates    [address] [phone]  9/1/2003 - None Entered    [facility] [id number]       Subscriber Name Subscriber Birth Date Member ID       NASRA PERSAUD 1940 [id number]           Secondary Coverage     Payor Plan Insurance Group Employer/Plan Group    AETNA COMMERCIAL AETNA   SUPP [id number]     Payor Plan Address Payor Plan Phone Number Payor Plan Fax Number Effective Dates    [address] [phone]  6/10/2019 - None Entered    [facility] [id number]       Subscriber Name Subscriber Birth Date Member ID       NASRA PERSAUD 1940 [id number]                 Emergency Contacts      (Rel.) Home Phone Work Phone Mobile Phone    Ginna Portillo (Daughter) -- -- [phone]    Ana persaud (Daughter) -- -- [phone]    WilfridoLeon (Daughter) -- -- [phone]    Ayla Sneed -- -- [phone]        Case " Management  Consult [[id number]] (Order [id number])   Order   Date: 6/12/2019 Department: [address] Released By/Authorizing: Suad Grayson APRN (auto-released)   Order History   Inpatient   Date/Time Action Taken User Additional Information   06/12/19 1431 Release Suad Grayson APRN (auto-released) From Order: [id number]   06/13/19 0803 Complete No Hernandez BSW    Order Details     Frequency Duration Priority Order Class   Once 1  occurrence Routine Hospital Performed   Order Questions     Question Answer Comment   Reason for Consult? plan for home with hospice           Completion Info     User Date/Time   No Hernandez BSW 06/13/19 0803   Consult Order Info     ID Description Priority Start Date Start Time   [id number] Case Management  Consult Routine 06/12/2019  2:31 PM   Provider Specialty Referred to   ______________________________________ _____________________________________   Acknowledgement Info     For At Acknowledged By Acknowledged On   Placing Order 06/12/19 1431 Nelsy Melchor RN 06/12/19 1516   Reprint Order Requisition     Case Management  Consult (Order #[id number]) on 6/12/19   Encounter     View Encounter           Order Provider Info         Office phone Pager E-mail   Ordering User Suad Grayson APRN [phone] -- --   Authorizing Provider Suad Grayson APRN [phone] -- --   Attending Provider Himanshu Grissom [phone] -- --   Order Transmittal Tracking     Case Management  Consult (Order #[id number]) on 6/12/19            History & Physical      Himanshu Grissom MD at 6/10/2019  6:14 PM              [facility] Medicine Services  HISTORY AND PHYSICAL    Date of Admission: 6/10/2019  Primary Care Physician: Shane Hsu MD    Subjective     Chief Complaint: weakness, shortness of breath    History of Present Illness    Mr. Becerra is a 78  yo M with a past medical history of emphysema, DM, hypothyroidism, and a history of hypertension.  Patient has had over the last 3 weeks worsening chest pressure and loss of 9 lbs.  Patient has lost >15 lbs over the last 2-3 months.  Patient reports over the last week he has had nausea, vomiting, and early satiety and decrease oral intake.  Patient had a CT scan on 6/4 from his primary care doctor that show concern for right-lower lobe malignancy (daughter has the CD with images) and concern for adrenal metastasis and hilar lymph node metastasis.    Patient has developed worsening dyspnea, shortness of breath, and fatigue recently.  He previously was able to perform most of his ADLs and function quite well but since the symptoms started to worsen 3 weeks ago he cannot ambulate 100 feet without feeling short of breath.    Patient has a >60 pack-year smoking history.         Review of Systems   Constitutional: Positive for activity change, appetite change, fatigue and unexpected weight change. Negative for chills, diaphoresis and fever.   Respiratory: Positive for shortness of breath. Negative for cough.    Cardiovascular: Negative for chest pain and palpitations.   Gastrointestinal: Negative for abdominal distention, abdominal pain, constipation, diarrhea, nausea and vomiting.   Neurological: Positive for weakness.   All other systems reviewed and are negative.       Otherwise complete ROS reviewed and negative except as mentioned in the HPI.    Past Medical History:   Past Medical History:   Diagnosis Date   • Diabetes (CMS/HCC)    • Hypertension    • Thyroid disease      Past Surgical History: Cerebral angiogram  Social History:  reports that he quit smoking about 4 weeks ago. His smoking use included cigarettes. He quit after 65.00 years of use. He has never used smokeless tobacco. He reports that he does not drink alcohol or use drugs.    Family History: family history includes COPD in his brother and father;  "Cancer in his father; Heart disease in his mother.    Allergies:  Allergies   Allergen Reactions   • Penicillins Swelling     Medications:  Prior to Admission medications    Medication Sig Start Date End Date Taking? Authorizing Provider   allopurinol (ZYLOPRIM) 300 MG tablet Take 300 mg by mouth Daily.   Yes Pablo Aldana MD   atorvastatin (LIPITOR) 80 MG tablet Take 80 mg by mouth Daily.   Yes Pablo Aldana MD   carvedilol (COREG) 12.5 MG tablet Take 12.5 mg by mouth 2 (Two) Times a Day With Meals.   Yes Pablo Aldana MD   levothyroxine (SYNTHROID, LEVOTHROID) 75 MCG tablet Take 75 mcg by mouth Daily.   Yes Pablo Aldana MD   metFORMIN (GLUCOPHAGE) 500 MG tablet Take 500 mg by mouth 2 (Two) Times a Day With Meals.   Yes Pablo Aldana MD     Objective     Vital Signs: /95   Pulse 95   Temp 98.3 °F (36.8 °C) (Oral)   Resp 25   Ht 182.9 cm (72\")   Wt 73.3 kg (161 lb 9.6 oz)   SpO2 90%   BMI 21.92 kg/m²    Physical Exam   Constitutional: He is oriented to person, place, and time. No distress.   HENT:   Head: Normocephalic and atraumatic.   Eyes: Conjunctivae are normal. No scleral icterus.   Neck: Neck supple. No JVD present.   Cardiovascular: Normal rate and regular rhythm.   Murmur heard.  Pulmonary/Chest: Effort normal. He has wheezes.   Right sided rhonchi; diminished at bilateral bases   Abdominal: Soft. Bowel sounds are normal. He exhibits no distension and no mass. There is no tenderness. There is no guarding.   Musculoskeletal: He exhibits no edema.   Neurological: He is alert and oriented to person, place, and time.   Skin: Skin is warm and dry. He is not diaphoretic. No erythema.   Psychiatric: He has a normal mood and affect. His behavior is normal.   Nursing note and vitals reviewed.          Results Reviewed:  Lab Results (last 24 hours)     Procedure Component Value Units Date/Time    Blood Culture With WILLARD - Blood, Arm, Right [[id number]] Collected:  " 06/10/19 1625    Specimen:  Blood from Arm, Right Updated:  06/10/19 1652    Blood Culture With WILLARD - Blood, Arm, Left [[id number]] Collected:  06/10/19 1623    Specimen:  Blood from Arm, Left Updated:  06/10/19 1652    Comprehensive Metabolic Panel [[id number]]  (Abnormal) Collected:  06/10/19 1221    Specimen:  Blood Updated:  06/10/19 1417     Glucose 118 mg/dL      BUN 38 mg/dL      Creatinine 1.11 mg/dL      Sodium 141 mmol/L      Potassium 4.1 mmol/L      Chloride 104 mmol/L      CO2 28.0 mmol/L      Calcium 9.3 mg/dL      Total Protein 7.3 g/dL      Albumin 3.60 g/dL      ALT (SGPT) 25 U/L      AST (SGOT) 47 U/L      Alkaline Phosphatase 85 U/L      Total Bilirubin 1.4 mg/dL      eGFR Non African Amer 64 mL/min/1.73      Globulin 3.7 gm/dL      A/G Ratio 1.0 g/dL      BUN/Creatinine Ratio 34.2     Anion Gap 9.0 mmol/L     Narrative:       GFR Normal >60  Chronic Kidney Disease <60  Kidney Failure <15    CBC & Differential [[id number]] Collected:  06/10/19 1221    Specimen:  Blood Updated:  06/10/19 1356    Narrative:       The following orders were created for panel order CBC & Differential.  Procedure                               Abnormality         Status                     ---------                               -----------         ------                     CBC Auto Differential[[id number]]        Abnormal            Final result                 Please view results for these tests on the individual orders.    CBC Auto Differential [[id number]]  (Abnormal) Collected:  06/10/19 1221    Specimen:  Blood Updated:  06/10/19 1356     WBC 10.97 10*3/mm3      RBC 4.15 10*6/mm3      Hemoglobin 12.6 g/dL      Hematocrit 39.1 %      MCV 94.2 fL      MCH 30.4 pg      MCHC 32.2 g/dL      RDW 15.9 %      RDW-SD 54.9 fl      MPV 12.3 fL      Platelets 134 10*3/mm3      Neutrophil % 79.6 %      Lymphocyte % 9.1 %      Monocyte % 8.5 %      Eosinophil % 1.6 %      Basophil % 0.5 %      Immature Grans % 0.7 %       Neutrophils, Absolute 8.72 10*3/mm3      Lymphocytes, Absolute 1.00 10*3/mm3      Monocytes, Absolute 0.93 10*3/mm3      Eosinophils, Absolute 0.18 10*3/mm3      Basophils, Absolute 0.06 10*3/mm3      Immature Grans, Absolute 0.08 10*3/mm3      nRBC 0.0 /100 WBC     BNP [[id number]]  (Normal) Collected:  06/10/19 1221    Specimen:  Blood Updated:  06/10/19 1336     proBNP 1,320.0 pg/mL     Protime-INR [[id number]]  (Normal) Collected:  06/10/19 1221    Specimen:  Blood Updated:  06/10/19 1336     Protime 14.5 Seconds      INR 1.09    aPTT [[id number]]  (Normal) Collected:  06/10/19 1221    Specimen:  Blood Updated:  06/10/19 1336     PTT 34.8 seconds     [place] Draw [[id number]] Collected:  06/10/19 1221    Specimen:  Blood Updated:  06/10/19 1331    Narrative:       The following orders were created for panel order [place] Draw.  Procedure                               Abnormality         Status                     ---------                               -----------         ------                     Light Blue Top[[id number]]                                   Final result               Green Top (Gel)[[id number]]                                  Final result               Lavender Top[[id number]]                                     Final result               Red Top[[id number]]                                          Final result                 Please view results for these tests on the individual orders.    Light Blue Top [[id number]] Collected:  06/10/19 1221    Specimen:  Blood Updated:  06/10/19 1331     Extra Tube hold for add-on     Comment: Auto resulted       Green Top (Gel) [[id number]] Collected:  06/10/19 1221    Specimen:  Blood Updated:  06/10/19 1331     Extra Tube Hold for add-ons.     Comment: Auto resulted.       Lavender Top [[id number]] Collected:  06/10/19 1221    Specimen:  Blood Updated:  06/10/19 1331     Extra Tube hold for add-on     Comment: Auto resulted       Red Top [[id number]] Collected:   06/10/19 1221    Specimen:  Blood Updated:  06/10/19 1331     Extra Tube Hold for add-ons.     Comment: Auto resulted.       Troponin [[id number]]  (Abnormal) Collected:  06/10/19 1221    Specimen:  Blood Updated:  06/10/19 1303     Troponin I 0.402 ng/mL         Imaging Results (last 24 hours)     Procedure Component Value Units Date/Time    CT Angiogram Chest With Contrast [[id number]] Collected:  06/10/19 1537     Updated:  06/10/19 1547    Narrative:       CT ANGIOGRAM CHEST W CONTRAST- 6/10/2019 3:24 PM CDT     HISTORY: soa, cough, hx of ca, elevated troponin      COMPARISON: None     DOSE LENGTH PRODUCT: 318 mGy cm. Automated exposure control was also  utilized to decrease patient radiation dose.     TECHNIQUE: Axial images the chest are obtained following IV contrast.  2-D and maximal intensity projection images are reconstructed and  reviewed.     FINDINGS:  No pulmonary emboli are identified. There is scattered  calcification of the normal caliber thoracic aorta with no aneurysm or  dissection visualized. There is borderline cardiac Meckley. No  pericardial or pleural effusions are identified.     There is bulky mediastinal and bilateral hilar lymphadenopathy. Right  hilar lymph nodes measure up to 3.8 cm. Subcarinal lymph nodes measuring  up to 2.8 cm. Left hilar lymph nodes measuring 2.5 cm. There is no  axillary lymphadenopathy.     There are bilateral adrenal nodules, largest on the right measuring 4.5  cm. Left adrenal nodule measures 2.2 cm. There are right renal cyst.  There is a peripherally calcified slightly hyperdense right renal  lesion.     There are emphysematous changes of the lungs. There is a 1.4 cm right  middle lobe nodule on image 91. There is nodular opacification of the  right lower lobe. There is apical lung scarring. 10 mm nodule seen in  the anterior left upper lobe on image 47. There is peripheral  honeycombing as well as chronic interstitial lung change. Posterior  pleural  thickening, mild.     No focal destructive osseous lesions are visualized.       Impression:       1. No pulmonary emboli.  2. Nodular opacification the right lower lobe may represent a  combination of pneumonia and malignancy. Pathologic mediastinal and  hilar lymphadenopathy likely lymphatic metastasis. Right middle and left  upper lobe nodules raise the question of pulmonary metastasis. These  should be followed on subsequent imaging.  3. Adrenal metastasis suspected.  4. Renal cysts. A peripherally calcified hyperdense right renal lesion,  perhaps hyperdense cyst. This should be compared to any outside films.  5. Emphysema with honeycombing/chronic interstitial lung change.  This report was finalized on 06/10/2019 15:44 by Dr. Rose Hooper MD.        I have personally reviewed and interpreted the radiology studies and ECG obtained at time of admission.     Assessment / Plan     Assessment:   Active Hospital Problems    Diagnosis   • Pneumonia of right lower lobe due to infectious organism (CMS/HCC)   • Lung cancer (CMS/HCC), suspected, with metastasis to hilar lymph nodes and adrenal glands   • Type 2 diabetes mellitus (CMS/HCC)   • Weight loss   • Tobacco dependence   • Malnutrition (CMS/HCC)         Plan:   1.  Admit to medicine  2.  Strep and legionella urinary antigens, blood cultures  3.  Echocardiogram  4.  CT A/P with contrast in 48 hours since received contrast today   5.  Pulmonary consultation  6.  If cancer, likely stage 4 due to extrathoracic metastasis  7.  Ceftriaxone 2 gm and azithromycin  8.  Outpatient biopsy, likely IR guided   9.  Monitor on telemetry  10.  Trend troponins, suspect Type 2 NSTEMI and not Type 1 given clinical picture        Code Status: DNR/DNI    POA Ginna Portillo (daughter) to make decisions if he is unable     I discussed the patient's findings and my recommendations with the patient and family    Estimated length of stay 2-4    Himanshu Grissom MD   06/10/19   6:14  PM            Electronically signed by Himanshu Grissom MD at 6/10/2019  9:53 PM       Prior to Admission Medications     Prescriptions Last Dose Informant Patient Reported? Taking?    allopurinol (ZYLOPRIM) 300 MG tablet 6/9/2019  Yes Yes    Take 300 mg by mouth Daily.    atorvastatin (LIPITOR) 80 MG tablet 6/9/2019  Yes Yes    Take 80 mg by mouth Daily.    carvedilol (COREG) 12.5 MG tablet 6/10/2019  Yes Yes    Take 12.5 mg by mouth 2 (Two) Times a Day With Meals.    levothyroxine (SYNTHROID, LEVOTHROID) 75 MCG tablet 6/9/2019  Yes Yes    Take 75 mcg by mouth Daily.    metFORMIN (GLUCOPHAGE) 500 MG tablet 6/9/2019  Yes Yes    Take 500 mg by mouth 2 (Two) Times a Day With Meals.          Hospital Medications (active)       Dose Frequency Start End    acetaminophen (TYLENOL) tablet 650 mg 650 mg Every 4 Hours PRN 6/10/2019     Sig - Route: Take 2 tablets by mouth Every 4 (Four) Hours As Needed for Mild Pain . - Oral    AZITHROMYCIN 500 MG/250 ML 0.9% NS IVPB (vial-mate) 500 mg Every 24 Hours 6/11/2019 6/14/2019    Sig - Route: Infuse 500 mg into a venous catheter Daily. - Intravenous    carvedilol (COREG) tablet 12.5 mg 12.5 mg 2 Times Daily With Meals 6/10/2019     Sig - Route: Take 2 tablets by mouth 2 (Two) Times a Day With Meals. - Oral    Cosign for Ordering: Accepted by Himanshu Grissom MD on 6/10/2019  5:00 PM    cefTRIAXone (ROCEPHIN) 2 g/100 mL 0.9% NS VTB (ADONAY) 2 g Every 24 Hours 6/11/2019 6/18/2019    Sig - Route: Infuse 100 mL into a venous catheter Daily. - Intravenous    dextrose (D50W) 25 g/ 50mL Intravenous Solution 25 g 25 g Every 15 Minutes PRN 6/10/2019     Sig - Route: Infuse 50 mL into a venous catheter Every 15 (Fifteen) Minutes As Needed for Low Blood Sugar (Blood Sugar Less Than 70). - Intravenous    dextrose (GLUTOSE) oral gel 15 g 15 g Every 15 Minutes PRN 6/10/2019     Sig - Route: Take 15 application by mouth Every 15 (Fifteen) Minutes As Needed for Low Blood Sugar (Blood sugar less  "than 70). - Oral    glucagon (human recombinant) (GLUCAGEN DIAGNOSTIC) injection 1 mg 1 mg As Needed 6/10/2019     Sig - Route: Inject 1 mg under the skin into the appropriate area as directed As Needed for Low Blood Sugar (Blood Glucose Less Than 70). - Subcutaneous    HYDROcodone-acetaminophen (NORCO) 5-325 MG per tablet 1 tablet 1 tablet Every 4 Hours PRN 6/12/2019 6/22/2019    Sig - Route: Take 1 tablet by mouth Every 4 (Four) Hours As Needed for Moderate Pain  (air hunger, respirations greater than 24/minute, or pain). - Oral    insulin lispro (humaLOG) injection 2-7 Units 2-7 Units 4 Times Daily With Meals & Nightly 6/10/2019     Sig - Route: Inject 2-7 Units under the skin into the appropriate area as directed 4 (Four) Times a Day With Meals & at Bedtime. - Subcutaneous    ipratropium-albuterol (DUO-NEB) nebulizer solution 3 mL 3 mL 4 Times Daily - RT 6/10/2019     Sig - Route: Take 3 mL by nebulization 4 (Four) Times a Day. - Nebulization    levothyroxine (SYNTHROID, LEVOTHROID) tablet 75 mcg 75 mcg Every Early Morning 6/11/2019     Sig - Route: Take 1 tablet by mouth Every Morning. - Oral    ondansetron (ZOFRAN) injection 4 mg 4 mg Every 6 Hours PRN 6/10/2019     Sig - Route: Infuse 2 mL into a venous catheter Every 6 (Six) Hours As Needed for Nausea or Vomiting. - Intravenous    Linked Group 1:  \"Or\" Linked Group Details        ondansetron (ZOFRAN) tablet 4 mg 4 mg Every 6 Hours PRN 6/10/2019     Sig - Route: Take 1 tablet by mouth Every 6 (Six) Hours As Needed for Nausea or Vomiting. - Oral    Linked Group 1:  \"Or\" Linked Group Details        polyethylene glycol 3350 powder (packet) 17 g Daily 6/12/2019     Sig - Route: Take 17 g by mouth Daily. - Oral    predniSONE (DELTASONE) tablet 40 mg 40 mg Daily With Breakfast 6/13/2019     Sig - Route: Take 2 tablets by mouth Daily With Breakfast. - Oral    sodium chloride 0.9 % flush 3 mL 3 mL Every 12 Hours Scheduled 6/10/2019     Sig - Route: Infuse 3 mL into a " "venous catheter Every 12 (Twelve) Hours. - Intravenous    sodium chloride 0.9 % flush 3-10 mL 3-10 mL As Needed 6/10/2019     Sig - Route: Infuse 3-10 mL into a venous catheter As Needed for Line Care. - Intravenous             Physician Progress Notes (most recent note)      Himanshu Grissom MD at 6/12/2019 12:46 PM              [facility] Medicine Services  INPATIENT PROGRESS NOTE    Patient Name: Jake Becerra  Date of Admission: 6/10/2019  Today's Date: 06/12/19  Length of Stay: 2  Primary Care Physician: Shane Hsu MD    Subjective   Chief Complaint: \"Feeling some better\"  HPI       Patient was seen and examined at bedside.  Patient indicates that he is feeling some better.  They were unable to perform the biopsy this morning, as they could not find a window did not pass through the base of the lung.  Patient indicates that he would like to go home with hospice.  We will continue the treatment for his pneumonia.        Review of Systems   Constitutional: Negative for chills and fever.   Respiratory: Positive for cough and shortness of breath.    Cardiovascular: Negative for chest pain and palpitations.   Gastrointestinal: Negative for abdominal distention, abdominal pain, constipation, diarrhea, nausea and vomiting.   Musculoskeletal: Negative for arthralgias and myalgias.   Skin: Negative for pallor, rash and wound.        All pertinent negatives and positives are as above. All other systems have been reviewed and are negative unless otherwise stated.     Objective    Temp:  [97.5 °F (36.4 °C)-98.2 °F (36.8 °C)] 98.1 °F (36.7 °C)  Heart Rate:  [55-88] 88  Resp:  [16-18] 18  BP: (125-155)/(62-88) 149/62  Physical Exam  Constitutional: He is oriented to person, place, and time. No distress.   HENT:   Head: Normocephalic and atraumatic.   Eyes: Conjunctivae are normal. No scleral icterus.   Neck: Neck supple. No JVD present.   Cardiovascular: Normal rate and regular " rhythm.   Murmur heard.  Pulmonary/Chest: Effort normal. He has wheezes.   Right sided rhonchi;   Abdominal: Soft. Bowel sounds are normal. He exhibits no distension and no mass. There is no tenderness. There is no guarding.   Musculoskeletal: He exhibits no edema.   Neurological: He is alert and oriented to person, place, and time.   Skin: Skin is warm and dry. He is not diaphoretic. No erythema.   Psychiatric: He has a normal mood and affect. His behavior is normal.   Nursing note and vitals reviewed.        Results Review:  I have reviewed the labs, radiology results, and diagnostic studies.    Laboratory Data:   Results from last 7 days   Lab Units 06/12/19  0319 06/11/19  0604 06/10/19  1221   WBC 10*3/mm3 8.62 8.78 10.97*   HEMOGLOBIN g/dL 11.3* 11.6* 12.6*   HEMATOCRIT % 35.6* 36.0* 39.1*   PLATELETS 10*3/mm3 118* 138 134        Results from last 7 days   Lab Units 06/12/19  0319 06/11/19  0604 06/10/19  1221   SODIUM mmol/L 140 140 141   POTASSIUM mmol/L 4.0 4.1 4.1   CHLORIDE mmol/L 108 107 104   CO2 mmol/L 27.0 27.0 28.0   BUN mg/dL 33* 37* 38*   CREATININE mg/dL 0.99 1.05 1.11   CALCIUM mg/dL 8.6 8.7 9.3   BILIRUBIN mg/dL 0.8  --  1.4*   ALK PHOS U/L 84  --  85   ALT (SGPT) U/L 29  --  25   AST (SGOT) U/L 46*  --  47*   GLUCOSE mg/dL 98 95 118*       Culture Data:   Blood Culture   Date Value Ref Range Status   06/10/2019 No growth at 24 hours  Preliminary   06/10/2019 No growth at 24 hours  Preliminary     Respiratory Culture   Date Value Ref Range Status   06/11/2019 Light growth (2+) Normal Respiratory Joan  Preliminary       Radiology Data:   Imaging Results (last 24 hours)     Procedure Component Value Units Date/Time    CT Abdomen Without Contrast [[id number]] Updated:  06/12/19 1147          I have reviewed the patient's current medications.     Assessment/Plan     Active Hospital Problems    Diagnosis   • Moderate malnutrition (CMS/HCC)   • Pneumonia of right lower lobe due to infectious organism  (CMS/HCC)   • Lung cancer (CMS/HCC), suspected, with metastasis to hilar lymph nodes and adrenal glands   • Type 2 diabetes mellitus (CMS/HCC)   • Weight loss   • Tobacco dependence       Plan:  1.  Palliative care consultation   2.  Continue azithromycin and ceftriaxone  3.  Pulmonary tolieting  4.  Duonebs scheduled  5.  Start prednisone 40 mg   6.  Ambulate patient  7.  Discussed with patient goals of care, he would like his pneumonia treated and then go home and think about what if anything he would like to do   8.  Case discussed with Suad REESE palliative care.  Hydrocodone 5 mg PRN for air hunger and pain   9.  Strep and legionella urinary antigen were negative  10.  AM lab holiday       Case discussed with patient and family and bedside RN.          Discharge Planning: I expect the patient to be discharged to ? in ? days.    Himanshu Grissom MD   06/12/19   12:46 PM    Electronically signed by Himanshu Grissom MD at 6/12/2019  3:21 PM          Consult Notes (most recent note)      Suad Grayson APRN at 6/12/2019 12:49 PM      Consult Orders    1. Inpatient Palliative Care Consult [[id number]] ordered by Himanshu Grissom MD at 06/12/19 1246     2. Inpatient Palliative Care Consult [[id number]] ordered by Himanshu Grissom MD at 06/12/19 1233                Palliative Care Initial Consult   Attending Physician: Himanshu Grissom MD  Referring Provider: Himanshu Grissom MD    Reason for Referral: assistance with clarification of goals of care and Comfort care, and support for patient and family  Family/Support: daughters x 2 at bedside  Goals of Care: TBD.  Code Status and Medical Interventions:   Ordered at: 06/10/19 1829     Limited Support to NOT Include:    Intubation     Level Of Support Discussed With:    Patient     Code Status:    No CPR     Medical Interventions (Level of Support Prior to Arrest):    Limited         HPI:   78 y.o. male with past medical history significant for  COPD-emphysema, diabetes mellitus type 2, hypothyroidism, tobacco dependence 60+ years-recently quit 3-4 weeks ago, impaired mobility-uses rolling walker, and hypertension.  Recent CT on June 4 with concern for right lower lobe malignancy and concern for right hilar lymph node and adrenal metastasis. Patient presented to [facility] on 6/10/2019 related to shortness of breath and fatigue.  Patient report greater than 25 pound weight loss over the last few months.  CT of chest revealed nodular opacification of the right lower lobe may represent pneumonia and malignancy.  Pathologic mediastinal and hilar lymphadenopathy likely lymphatic metastasis.  Right middle and left upper lobe nodules (1.4 cm right middle lobe nodule, there is nodular opacification of the right lower lob, 10 mm nodule seen in the anterior left upper lobe) concerning for pulmonary metastasis.  Adrenal metastasis suspected.  Emphysema with honeycombing\chronic interstitial lung change, consistent with fibrosis per Dr. Pride pulmonology.  Adrenal biopsy was attempted but unable to be performed due to interpositional lung bases.  A CT of the abdomen revealed questionable hypodense small liver lesions. Palliative Care Spoke With: patient and family reports quality of life and functional status have declined significantly over the last 1 and half to 2 months.  Patient reports a unintentional weight loss of approximately 30 pounds. Due to the Palliative Care Topics Discussed: palliative care, goals of care, care options, resuscitation status, [facility] and discharge options we will establish an advance care plan.   Advance Care Planning   Advance Care Planning Discussion: Patient and family able to verbalize a very good understanding of patient's current health status with recent findings of presumed metastatic lung cancer, multiple co-morbidities, and advanced age.  Based upon this discussion we further assessed care goals moving forward and  "discussed discharge options to include home with hospice versus nursing facility.  We reviewed hospice role and end-of-life care.  We additionally reviewed medications to assist with symptom management including low-dose opioid therapy.  Patient and family agreeable to trial of low-dose opioid with hopes to improve air hunger. Patient and family have a very good insight/knowledge of care options available. Patient and family have elected to transition home with hospice and not pursue overly aggressive care measures or treatments.  Attempts to obtain adrenal biopsy were unsuccessful today.  Patient reports that \"I have had a good life and I am ready when the good Lord is ready to take me\".  Patient has a very good support system in place and family plan to provide 24\7 caregiver support as needed.  Additionally patient has a strong spiritual support system in place.  We explored patient's current advance directive status. We reviewed purpose and goals for advance care planning. I reviewed with Jake qualities to consider when choosing a healthcare agent. Jake had selected daughters, Ginna Portillo and Ana Becerra as his healthcare agents. I encouraged Jake to discuss his preferences for future care with healthcare agents and others close to him. Jake describes past experiences dealing with friends or family who have been seriously ill. Jake describes cultural, Shinto, spiritual or personal practices/beliefs that are important to him or might help him choose the care wanted, or not wanted. Goals of medical care for severe, permanent brain injury were explored. Each section of the advance care/living will document was reviewed and discussed. Advance care/living will document finished during this facilitation.  Based upon decisions for transition home with hospice we further reviewed and initiated medical orders for scope of treatment (MOST) form in line with goals of care to include no CPR, limited " interventions-no intubation or no BiPAP, and no feeding tube.  Questions encouraged and support given.    Review of Systems   Constitution: Positive for decreased appetite, weakness, malaise/fatigue and weight loss.   Cardiovascular: Negative for leg swelling.   Respiratory: Positive for cough and shortness of breath.    Gastrointestinal: Positive for nausea. Negative for constipation and vomiting.        Uses FiberCon regularly 3 times weekly.   Psychiatric/Behavioral: The patient is not nervous/anxious.    All other systems reviewed and are negative.      Past Medical History:   Diagnosis Date   • Diabetes (CMS/HCC)    • Hypertension    • Thyroid disease      History reviewed. No pertinent surgical history.  Social History     Socioeconomic History   • Marital status:      Spouse name: Not on file   • Number of children: Not on file   • Years of education: Not on file   • Highest education level: Not on file   Occupational History   • Occupation:      Comment: [employer]   • Occupation:      Comment: MANAS WAITE   Tobacco Use   • Smoking status: Former Smoker     Years: 65.00     Types: Cigarettes     Last attempt to quit: 5/10/2019     Years since quittin.0   • Smokeless tobacco: Never Used   • Tobacco comment: Smoked for 60 years   Substance and Sexual Activity   • Alcohol use: No     Frequency: Monthly or less   • Drug use: No       Allergies   Allergen Reactions   • Penicillins Swelling       Current medication reviewed for route, type, dose and frequency and are current per MAR at time of dictation.      Diagnostics: Reviewed    Patient Active Problem List   Diagnosis   • Pneumonia of right lower lobe due to infectious organism (CMS/HCC)   • Lung cancer (CMS/HCC), suspected, with metastasis to hilar lymph nodes and adrenal glands   • Type 2 diabetes mellitus (CMS/HCC)   • Weight loss   • Tobacco dependence   • Malnutrition (CMS/HCC)   • Moderate malnutrition  "(CMS/[facility])       Physical Exam:    /62 (BP Location: Right arm, Patient Position: Lying)   Pulse 88   Temp 98.1 °F (36.7 °C) (Oral)   Resp 18   Ht 182.9 cm (72\")   Wt 73 kg (161 lb)   SpO2 93%   BMI 21.84 kg/m²      Physical Exam   Constitutional: He is oriented to person, place, and time. He appears well-developed. He appears cachectic. He appears ill. No distress. Nasal cannula in place.   HENT:   Head: Normocephalic and atraumatic.   Eyes: Pupils are equal, round, and reactive to light.   Neck: Normal range of motion.   Cardiovascular: Normal rate, regular rhythm and intact distal pulses.   Murmur heard.  Pulmonary/Chest: Accessory muscle usage present. No respiratory distress. He has decreased breath sounds. He has rales.   Abdominal: Soft. Bowel sounds are normal.   Musculoskeletal: Normal range of motion.   Neurological: He is alert and oriented to person, place, and time.   Skin: Skin is warm and dry.   Psychiatric: He has a normal mood and affect. His speech is normal and behavior is normal. Judgment and thought content normal. Cognition and memory are normal.         Patient status: Disease state: Controlled with current treatments.  Functional status: Palliative Performance Scale Score: Performance 40% based on the following measures: Ambulation: Mainly in bed, Activity and Evidence of Disease: Unable to do any work, extensive evidence of disease, Self-Care: Mainly assistance required,  Intake: Normal or reduced, LOC: Full, drowsy or confusion   ECOG Status(3) Capable of limited self-care, confined to bed or chair > 50% of waking hours.  Nutritional status: Albumin 3.0, greater than 25 pound weight loss, unintentional. Body mass index is 21.84 kg/m².         Family support: The patient receives support from his children..  POA/Healthcare surrogate-advance directive completed today    Impression/Problem List:    1.  COPD with emphysema  2.  Pulmonary fibrosis  3.  Suspected lung cancer with " metastasis to hilar lymph nodes and bilateral adrenal glands ? liver lesions per CT abd., unable to complete adrenal biopsy.  4.  Pneumonia in the right lower lobe  5.  Unintentional weight loss, 30 pounds over last 1-1/2 to 2 months  6.  Impaired mobility  7.  Tobacco dependence  8.  Diabetes mellitus, type II    Recommendations/Plan:  1. plan: Goals of care include CODE STATUS no CPR\limited interventions.     2.  Palliative care encounter  -CODE STATUS changes as delineated per detailed conversation above  -Reviewed and initiated medical orders for scope of treatment (MOST) form to further delineate care goals after discharge.  -Completed advanced directive  -Patient and family have elected to transition home with hospice.  Patient will have access to 24\7 caregiver assistance if needed.  Discussed with nurse and .    3.  Dyspnea  -Recommend add low-dose opioid hydrocodone\acetaminophen 5\325 mg every 4 hours as needed for air hunger, respirations greater than 24 breaths/min in patient with advanced COPD, pulmonary fibrosis, and lung cancer.  -antibiotics per attending-pneumonia    4.  Constipation  -Takes regular bowel regimen at home.  Start MiraLAX daily    5.  Nausea  -Controlled with Zofran.  May be multifactorial related to decreased gastric emptying.  Recommend regular bowel regimen as above.  May consider addition of Reglan 5 mg by mouth before meals 3 times daily.         Thank you for this consult and allowing us to participate in patient's plan of care. Palliative Care Team will continue to follow patient.     Time spent: 120 minutes spent reviewing medical and medication records, assessing and examining patient, discussing with family, answering questions, providing some guidance about a plan and documentation of care, and coordinating care with other healthcare members, with > 50% time spent face to face.   65 minutes spent on advance care planning.    Suad Grayson,  APRN  6/12/2019                  Electronically signed by Suad Grayson APRN at 6/12/2019  3:34 PM

## 2019-06-13 NOTE — PLAN OF CARE
Problem: Patient Care Overview  Goal: Plan of Care Review  Outcome: Ongoing (interventions implemented as appropriate)  Plan to discharge home with [facility] hospice 6/14/19  Goal: Individualization and Mutuality  Outcome: Ongoing (interventions implemented as appropriate)    Goal: Discharge Needs Assessment  Outcome: Ongoing (interventions implemented as appropriate)    Goal: Interprofessional Rounds/Family Conf  Outcome: Ongoing (interventions implemented as appropriate)      Problem: Infection, Risk/Actual (Adult)  Goal: Identify Related Risk Factors and Signs and Symptoms  Outcome: Ongoing (interventions implemented as appropriate)    Goal: Infection Prevention/Resolution  Outcome: Ongoing (interventions implemented as appropriate)      Problem: Patient Care Overview  Goal: Individualization and Mutuality  Outcome: Ongoing (interventions implemented as appropriate)    Goal: Discharge Needs Assessment  Outcome: Ongoing (interventions implemented as appropriate)    Goal: Interprofessional Rounds/Family Conf  Outcome: Ongoing (interventions implemented as appropriate)      Problem: Nutrition, Imbalanced: Inadequate Oral Intake (Adult)  Goal: Improved Oral Intake  Outcome: Ongoing (interventions implemented as appropriate)    Goal: Prevent Further Weight Loss  Outcome: Ongoing (interventions implemented as appropriate)      Problem: Palliative Care (Adult)  Goal: Identify Related Risk Factors and Signs and Symptoms  Outcome: Ongoing (interventions implemented as appropriate)    Goal: Maximized Comfort  Outcome: Ongoing (interventions implemented as appropriate)    Goal: Enhanced Quality of Life  Outcome: Ongoing (interventions implemented as appropriate)

## 2019-06-13 NOTE — PROGRESS NOTES
[facility] Services  INPATIENT PROGRESS NOTE    Patient Name: Jake Becerra  Date of Admission: 6/10/2019  Today's Date: 06/13/19  Length of Stay: 3  Primary Care Physician: Shane Hsu MD    Subjective   Chief Complaint: shortness of breath  HPI     Patient feels more short of breath.  But he does still want to go home tomorrow with O2.  Worry that he has a post-obstructive pneumonia that may not get better with antibiotics on its own, will treat for a total of 10 days to see if he has any improvement, but worry he will not get better and continue to decline.  Discussed with family and patient.          Review of Systems   Constitutional: Negative for chills and fever.   Respiratory: Negative for cough and shortness of breath.    Cardiovascular: Negative for chest pain and palpitations.   Gastrointestinal: Negative for abdominal distention, abdominal pain, diarrhea, nausea and vomiting.        All pertinent negatives and positives are as above. All other systems have been reviewed and are negative unless otherwise stated.     Objective    Temp:  [97.3 °F (36.3 °C)-99 °F (37.2 °C)] 98 °F (36.7 °C)  Heart Rate:  [] 89  Resp:  [16-22] 16  BP: (110-161)/(57-89) 144/89  Physical Exam  Constitutional: He is oriented to person, place, and time. No distress.   HENT:   Head: Normocephalic and atraumatic.   Eyes: Conjunctivae are normal. No scleral icterus.   Neck: Neck supple. No JVD present.   Cardiovascular: Normal rate and regular rhythm.   Murmur heard.  Pulmonary/Chest: Effort normal. He has no wheezes  Coarse upper airway   Abdominal: Soft. Bowel sounds are normal. He exhibits no distension and no mass. There is no tenderness. There is no guarding.   Musculoskeletal: He exhibits no edema.   Neurological: He is alert and oriented to person, place, and time.   Skin: Skin is warm and dry. He is not diaphoretic. No erythema.   Psychiatric: He has a normal mood and  affect. His behavior is normal.   Nursing note and vitals reviewed.          Results Review:  I have reviewed the labs, radiology results, and diagnostic studies.    Laboratory Data:   Results from last 7 days   Lab Units 06/12/19 0319 06/11/19  0604 06/10/19  1221   WBC 10*3/mm3 8.62 8.78 10.97*   HEMOGLOBIN g/dL 11.3* 11.6* 12.6*   HEMATOCRIT % 35.6* 36.0* 39.1*   PLATELETS 10*3/mm3 118* 138 134        Results from last 7 days   Lab Units 06/12/19 0319 06/11/19  0604 06/10/19  1221   SODIUM mmol/L 140 140 141   POTASSIUM mmol/L 4.0 4.1 4.1   CHLORIDE mmol/L 108 107 104   CO2 mmol/L 27.0 27.0 28.0   BUN mg/dL 33* 37* 38*   CREATININE mg/dL 0.99 1.05 1.11   CALCIUM mg/dL 8.6 8.7 9.3   BILIRUBIN mg/dL 0.8  --  1.4*   ALK PHOS U/L 84  --  85   ALT (SGPT) U/L 29  --  25   AST (SGOT) U/L 46*  --  47*   GLUCOSE mg/dL 98 95 118*       Culture Data:   Blood Culture   Date Value Ref Range Status   06/10/2019 No growth at 2 days  Preliminary   06/10/2019 No growth at 2 days  Preliminary     Respiratory Culture   Date Value Ref Range Status   06/11/2019 Light growth (2+) Normal Respiratory Joan  Final       Radiology Data:   Imaging Results (last 24 hours)     ** No results found for the last 24 hours. **          I have reviewed the patient's current medications.     Assessment/Plan     Active Hospital Problems    Diagnosis   • Acute respiratory failure with hypoxia (CMS/HCC), requiring nasal cannula   • Anemia   • Diastolic dysfunction   • Moderate malnutrition (CMS/HCC)   • Pneumonia of right lower lobe due to infectious organism (CMS/HCC)   • Lung cancer (CMS/HCC), suspected, with metastasis to hilar lymph nodes and adrenal glands   • Type 2 diabetes mellitus (CMS/HCC)   • Weight loss   • Tobacco dependence       Plan:  1.  Palliative care consultation - Home with hospice tomorrow  2.  Continue azithromycin x 3 and ceftriaxone x 10 days (omnicef as outpatient)  3.  Pulmonary tolieting  4.  Duonebs scheduled  5.   Continue prednisone 40 mg   6.  Ambulate patient  7.  No AM labs  8.  D/C tomorrow with O2 and hospice                  Discharge Planning: I expect the patient to be discharged to home with hospice in 1-2 days.    Himanshu Grissom MD   06/13/19   2:10 PM

## 2019-06-13 NOTE — PLAN OF CARE
Problem: Infection, Risk/Actual (Adult)  Goal: Identify Related Risk Factors and Signs and Symptoms  Outcome: Ongoing (interventions implemented as appropriate)   06/13/19 0429   Infection, Risk/Actual (Adult)   Related Risk Factors (Infection, Risk/Actual) chronic illness/condition;age extremes;malnutrition;sleep disturbance   Signs and Symptoms (Infection, Risk/Actual) blood glucose changes;malaise;weakness     Goal: Infection Prevention/Resolution  Outcome: Ongoing (interventions implemented as appropriate)   06/13/19 0429   Infection, Risk/Actual (Adult)   Infection Prevention/Resolution making progress toward outcome       Problem: Patient Care Overview  Goal: Plan of Care Review  Outcome: Ongoing (interventions implemented as appropriate)   06/13/19 0429   Coping/Psychosocial   Plan of Care Reviewed With patient   OTHER   Outcome Summary VSS. Patient with no complaint of pain. has been able to sleep fairly well. Will be dc'd home with hospice on Friday. Palliative care consult ordered. Will continue to assess and notify MD of any changes.   Plan of Care Review   Progress no change       Problem: Nutrition, Imbalanced: Inadequate Oral Intake (Adult)  Goal: Improved Oral Intake  Outcome: Ongoing (interventions implemented as appropriate)    Goal: Prevent Further Weight Loss  Outcome: Ongoing (interventions implemented as appropriate)   06/13/19 0429   Nutrition, Imbalanced: Inadequate Oral Intake (Adult)   Prevent Further Weight Loss making progress toward outcome       Problem: Palliative Care (Adult)  Goal: Identify Related Risk Factors and Signs and Symptoms  Outcome: Ongoing (interventions implemented as appropriate)   06/13/19 0429   Palliative Care (Adult)   Palliative Care: Related Risk Factors advanced age;chronic illness;terminal illness   Palliative Care: Signs and Symptoms breathlessness;loss of appetite     Goal: Maximized Comfort  Outcome: Ongoing (interventions implemented as appropriate)    06/13/19 0429   Palliative Care (Adult)   Maximized Comfort making progress toward outcome     Goal: Enhanced Quality of Life  Outcome: Ongoing (interventions implemented as appropriate)   06/13/19 0429   Palliative Care (Adult)   Enhanced Quality of Life making progress toward outcome

## 2019-06-13 NOTE — PROGRESS NOTES
"Palliative Care Daily Progress Note     support for patient/family and pain/symptom management    Code Status:   Code Status and Medical Interventions:   Ordered at: 06/12/19 8889     Limited Support to NOT Include:    Intubation    NIPPV (Non-Invasive Positive Pressure Support)     Level Of Support Discussed With:    Patient     Code Status:    No CPR     Medical Interventions (Level of Support Prior to Arrest):    Limited      Advanced Directives: Advance Directive Status: Patient does not have advance directive   Goals of Care: Ongoing.     S: Medical record reviewed. Events noted.  Reports breathing improved compared to yesterday.  Verbalizes improvement with breathing effort with trial dose of low-dose hydrocodone.  States that he required a dose approximately 1 hour ago and symptoms have improved dramatically since then.  His daughter is currently at bedside and they are both enjoying discussing future plans and goals, as well as, reminiscing. They are both tearful at times.  Both verbalize concerns about patient's anxiety and patient states sometimes \"I get to the point I just want to scream when I get so anxious\".  His hydrocodone has helped substantially with this, but he feels he could benefit from addition of anxiolytic for improved sleep.  We discussed possible side effects including increased drowsiness both verbalized understanding. Both complimentary of care team.    Review of Systems   Constitution: Positive for decreased appetite, weakness, malaise/fatigue.   Cardiovascular: Negative for leg swelling.   Respiratory: Positive for cough and shortness of breath improved compared to yesterday.    Gastrointestinal: Negative for constipation and vomiting.   Psychiatric/Behavioral: Positive for nervous/anxious.    All other systems reviewed and are negative.    O:       Intake/Output Summary (Last 24 hours) at 6/13/2019 1403  Last data filed at 6/13/2019 0600  Gross per 24 hour   Intake --   Output 500 ml " "  Net -500 ml       Diagnostics: Reviewed    Patient Active Problem List   Diagnosis   • Pneumonia of right lower lobe due to infectious organism (CMS/HCC)   • Lung cancer (CMS/HCC), suspected, with metastasis to hilar lymph nodes and adrenal glands   • Type 2 diabetes mellitus (CMS/HCC)   • Weight loss   • Tobacco dependence   • Malnutrition (CMS/HCC)   • Moderate malnutrition (CMS/HCC)   • Anemia   • Diastolic dysfunction       Physical Exam:    /89 (BP Location: Right arm, Patient Position: Lying)   Pulse 89   Temp 98 °F (36.7 °C) (Oral)   Resp 16   Ht 182.9 cm (72\")   Wt 76.1 kg (167 lb 11.2 oz)   SpO2 90%   BMI 22.74 kg/m²   Physical Exam   Constitutional: He is oriented to person, place, and time. He appears well-developed. He appears cachectic. He appears ill. No distress. Nasal cannula in place.   HENT:   Head: Normocephalic and atraumatic.   Eyes: Pupils are equal, round, and reactive to light.   Neck: Normal range of motion.   Cardiovascular: Normal rate, regular rhythm and intact distal pulses.   Murmur heard.  Pulmonary/Chest: Accessory muscle usage present. No respiratory distress. He has decreased breath sounds. He has rales.   Abdominal: Soft. Bowel sounds are normal.   Musculoskeletal: Normal range of motion.   Neurological: He is alert and oriented to person, place, and time.   Skin: Skin is warm and dry.   Psychiatric: He has a normal mood and affect. His speech is normal and behavior is normal. Judgment and thought content normal. Cognition and memory are normal.            Patient status: Disease state: Controlled with current treatments.  Functional status: Palliative Performance Scale Score: Performance 40% based on the following measures: Ambulation: Mainly in bed, Activity and Evidence of Disease: Unable to do any work, extensive evidence of disease, Self-Care: Mainly assistance required,  Intake: Normal or reduced, LOC: Full, drowsy or confusion   ECOG Status(3) Capable of " limited self-care, confined to bed or chair > 50% of waking hours.  Nutritional status: Albumin 3.0, greater than 25 pound weight loss, unintentional. Body mass index is 21.84 kg/m².      Family support: The patient receives support from his children..  POA/Healthcare surrogate-advance directive completed today     Impression/Problem List:     1.  COPD with emphysema  2.  Pulmonary fibrosis  3.  Suspected lung cancer with metastasis to hilar lymph nodes and bilateral adrenal glands ? liver lesions per CT abd., unable to complete adrenal biopsy.  4.  Pneumonia in the right lower lobe  5.  Unintentional weight loss, 30 pounds over last 1-1/2 to 2 months  6.  Impaired mobility  7.  Tobacco dependence  8.  Diabetes mellitus, type II     Recommendations/Plan:  1. plan: Goals of care include CODE STATUS no CPR\limited interventions.      2.  Palliative care encounter  -CODE STATUS changes as delineated per detailed conversation above  -Reviewed and initiated medical orders for scope of treatment (MOST) form to further delineate care goals after discharge.  -Completed advanced directive  -Patient and family have elected to transition home with hospice.  Patient will have access to 24\7 caregiver assistance if needed.      3.  Dyspnea  -Continue low-dose opioid hydrocodone\acetaminophen 5\325 mg every 4 hours as needed for air hunger, respirations greater than 24 breaths/min in patient with advanced COPD, pulmonary fibrosis, and lung cancer.  -antibiotics per attending-pneumonia     4.  Constipation  -Takes regular bowel regimen at home.  Continue MiraLAX daily     5.  Nausea  -Controlled     6. Anxiety  -Add xanax 0.25 mg two times daily as needed anxiety/sleep.      Thank you for allowing us to participate in patient's plan of care. Palliative Care Team will continue to follow patient.     Time spent: 40 minutes spent reviewing medical and medication records, assessing and examining patient, discussing with family,  answering questions, providing some guidance about a plan and documentation of care, and coordinating care with other healthcare members, with > 50% time spent face to face.       Suad Grayson, APRN  6/13/2019

## 2019-06-14 VITALS
RESPIRATION RATE: 16 BRPM | TEMPERATURE: 97.6 F | SYSTOLIC BLOOD PRESSURE: 144 MMHG | HEIGHT: 72 IN | OXYGEN SATURATION: 90 % | WEIGHT: 167.7 LBS | BODY MASS INDEX: 22.71 KG/M2 | HEART RATE: 86 BPM | DIASTOLIC BLOOD PRESSURE: 83 MMHG

## 2019-06-14 PROCEDURE — 94799 UNLISTED PULMONARY SVC/PX: CPT

## 2019-06-14 PROCEDURE — 94760 N-INVAS EAR/PLS OXIMETRY 1: CPT

## 2019-06-14 PROCEDURE — 63710000001 PREDNISONE PER 1 MG: Performed by: INTERNAL MEDICINE

## 2019-06-14 RX ORDER — LISINOPRIL 20 MG/1
20 TABLET ORAL 2 TIMES DAILY
COMMUNITY
End: 2019-06-14 | Stop reason: HOSPADM

## 2019-06-14 RX ORDER — INDAPAMIDE 1.25 MG/1
1.25 TABLET, FILM COATED ORAL 2 TIMES DAILY
COMMUNITY
End: 2019-06-14 | Stop reason: HOSPADM

## 2019-06-14 RX ORDER — CEFDINIR 300 MG/1
300 CAPSULE ORAL 2 TIMES DAILY
Qty: 4 CAPSULE | Refills: 0 | Status: SHIPPED | OUTPATIENT
Start: 2019-06-14 | End: 2019-06-16

## 2019-06-14 RX ORDER — ONDANSETRON 4 MG/1
4 TABLET, FILM COATED ORAL EVERY 6 HOURS PRN
Status: ON HOLD | COMMUNITY
End: 2019-06-14 | Stop reason: SDUPTHER

## 2019-06-14 RX ORDER — ONDANSETRON 4 MG/1
4 TABLET, FILM COATED ORAL EVERY 6 HOURS PRN
Qty: 30 TABLET | Refills: 0 | Status: SHIPPED | OUTPATIENT
Start: 2019-06-14

## 2019-06-14 RX ORDER — PREDNISONE 20 MG/1
40 TABLET ORAL
Qty: 6 TABLET | Refills: 0 | Status: SHIPPED | OUTPATIENT
Start: 2019-06-15

## 2019-06-14 RX ORDER — ALPRAZOLAM 0.25 MG/1
0.25 TABLET ORAL 2 TIMES DAILY PRN
Qty: 10 TABLET | Refills: 0 | Status: SHIPPED | OUTPATIENT
Start: 2019-06-14

## 2019-06-14 RX ORDER — CEFDINIR 300 MG/1
300 CAPSULE ORAL 2 TIMES DAILY
Qty: 4 CAPSULE | Refills: 0 | Status: SHIPPED | OUTPATIENT
Start: 2019-06-14 | End: 2019-06-14 | Stop reason: SDUPTHER

## 2019-06-14 RX ORDER — PREDNISONE 20 MG/1
40 TABLET ORAL
Qty: 6 TABLET | Refills: 0 | Status: SHIPPED | OUTPATIENT
Start: 2019-06-15 | End: 2019-06-14

## 2019-06-14 RX ORDER — ACETAMINOPHEN 325 MG/1
650 TABLET ORAL EVERY 4 HOURS PRN
Start: 2019-06-14

## 2019-06-14 RX ORDER — HYDROCODONE BITARTRATE AND ACETAMINOPHEN 5; 325 MG/1; MG/1
1 TABLET ORAL EVERY 4 HOURS PRN
Qty: 30 TABLET | Refills: 0 | Status: SHIPPED | OUTPATIENT
Start: 2019-06-14

## 2019-06-14 RX ADMIN — IPRATROPIUM BROMIDE AND ALBUTEROL SULFATE 3 ML: 2.5; .5 SOLUTION RESPIRATORY (INHALATION) at 10:55

## 2019-06-14 RX ADMIN — CARVEDILOL 12.5 MG: 6.25 TABLET, FILM COATED ORAL at 10:31

## 2019-06-14 RX ADMIN — LEVOTHYROXINE SODIUM 75 MCG: 75 TABLET ORAL at 06:11

## 2019-06-14 RX ADMIN — IPRATROPIUM BROMIDE AND ALBUTEROL SULFATE 3 ML: 2.5; .5 SOLUTION RESPIRATORY (INHALATION) at 07:00

## 2019-06-14 RX ADMIN — SODIUM CHLORIDE, PRESERVATIVE FREE 3 ML: 5 INJECTION INTRAVENOUS at 10:32

## 2019-06-14 RX ADMIN — PREDNISONE 40 MG: 20 TABLET ORAL at 10:31

## 2019-06-14 RX ADMIN — HYDROCODONE BITARTRATE AND ACETAMINOPHEN 1 TABLET: 5; 325 TABLET ORAL at 10:35

## 2019-06-14 NOTE — DISCHARGE SUMMARY
[facility] Medicine Services  DISCHARGE SUMMARY       Date of Admission: 6/10/2019  Date of Discharge:  6/14/2019  Primary Care Physician: Shane Hsu MD    Presenting Problem/History of Present Illness:  Shortness of breath and weight loss    Final Discharge Diagnoses:  Active Hospital Problems    Diagnosis   • Acute respiratory failure with hypoxia (CMS/HCC), requiring nasal cannula   • Anemia   • Diastolic dysfunction   • Moderate malnutrition (CMS/HCC)   • Pneumonia of right lower lobe due to infectious organism (CMS/HCC)   • Lung cancer (CMS/HCC), suspected, with metastasis to hilar lymph nodes and adrenal glands   • Type 2 diabetes mellitus (CMS/HCC)   • Weight loss   • Tobacco dependence       Consults: Pulmonary, Palliative    Procedures Performed: None    Pertinent Test Results:   Imaging Results (last 7 days)     Procedure Component Value Units Date/Time    CT Abdomen Without Contrast [[id number]] Collected:  06/12/19 1258     Updated:  06/12/19 1306    Narrative:       Limited CT ABDOMEN WO CONTRAST- 6/12/2019 10:32 AM CDT     HISTORY: Adrenal mass, 1-4cm, no hx malignancy; J18.1-Lobar pneumonia,  unspecified organism; R91.8-Other nonspecific abnormal finding of lung  field; R74.8-Abnormal levels of other serum enzymes      COMPARISON: CT chest 06/10/2019     DOSE LENGTH PRODUCT: 162 mGy cm. Automated exposure control was also  utilized to decrease patient radiation dose.     TECHNIQUE: Axial images of the abdomen are performed in the prone  position in preparation for adrenal biopsy. Due to the elevated  hemidiaphragms and interposition of lung bases between the adrenal mass  and the posterior cutaneous surface, biopsy not performed.     FINDINGS:  The 4.8 cm right adrenal mass is identified. A 3.1 cm left  adrenal nodule identified. Unfortunately, the lung bases are positioned  between these adrenal masses and the posterior skin surface therefore  percutaneous  sampling of these nodules is not feasible. There is nodular  right lower lobe opacification. Advanced emphysematous changes of lungs  with basilar honeycombing.     With liver windows, a few scattered small hypodense liver lesions are  suspected. A follow-up diagnostic CT abdomen and pelvis study should be  performed with IV and oral contrast to evaluate for potential liver  metastasis.       Impression:       1. Adrenal biopsy not performed due to interposition of lung bases  between the posterior skin surface and the adrenal lesions.  2. Questionable hypodense small liver lesions. A diagnostic CT abdomen  and pelvis study with IV and oral contrast recommended to evaluate for  additional potential pathology below the hemidiaphragms.  This report was finalized on 06/12/2019 13:03 by Dr. Rose Hooper MD.    CT Angiogram Chest With Contrast [[id number]] Collected:  06/10/19 1537     Updated:  06/10/19 1547    Narrative:       CT ANGIOGRAM CHEST W CONTRAST- 6/10/2019 3:24 PM CDT     HISTORY: soa, cough, hx of ca, elevated troponin      COMPARISON: None     DOSE LENGTH PRODUCT: 318 mGy cm. Automated exposure control was also  utilized to decrease patient radiation dose.     TECHNIQUE: Axial images the chest are obtained following IV contrast.  2-D and maximal intensity projection images are reconstructed and  reviewed.     FINDINGS:  No pulmonary emboli are identified. There is scattered  calcification of the normal caliber thoracic aorta with no aneurysm or  dissection visualized. There is borderline cardiac Meckley. No  pericardial or pleural effusions are identified.     There is bulky mediastinal and bilateral hilar lymphadenopathy. Right  hilar lymph nodes measure up to 3.8 cm. Subcarinal lymph nodes measuring  up to 2.8 cm. Left hilar lymph nodes measuring 2.5 cm. There is no  axillary lymphadenopathy.     There are bilateral adrenal nodules, largest on the right measuring 4.5  cm. Left adrenal nodule measures  2.2 cm. There are right renal cyst.  There is a peripherally calcified slightly hyperdense right renal  lesion.     There are emphysematous changes of the lungs. There is a 1.4 cm right  middle lobe nodule on image 91. There is nodular opacification of the  right lower lobe. There is apical lung scarring. 10 mm nodule seen in  the anterior left upper lobe on image 47. There is peripheral  honeycombing as well as chronic interstitial lung change. Posterior  pleural thickening, mild.     No focal destructive osseous lesions are visualized.       Impression:       1. No pulmonary emboli.  2. Nodular opacification the right lower lobe may represent a  combination of pneumonia and malignancy. Pathologic mediastinal and  hilar lymphadenopathy likely lymphatic metastasis. Right middle and left  upper lobe nodules raise the question of pulmonary metastasis. These  should be followed on subsequent imaging.  3. Adrenal metastasis suspected.  4. Renal cysts. A peripherally calcified hyperdense right renal lesion,  perhaps hyperdense cyst. This should be compared to any outside films.  5. Emphysema with honeycombing/chronic interstitial lung change.  This report was finalized on 06/10/2019 15:44 by Dr. Rose Hooper MD.        Lab Results (last 7 days)     Procedure Component Value Units Date/Time    Blood Culture With WILLARD - Blood, Arm, Right [[id number]] Collected:  06/10/19 1625    Specimen:  Blood from Arm, Right Updated:  06/13/19 1701     Blood Culture No growth at 3 days    Blood Culture With WILLARD - Blood, Arm, Left [[id number]] Collected:  06/10/19 1623    Specimen:  Blood from Arm, Left Updated:  06/13/19 1701     Blood Culture No growth at 3 days    POC Glucose Once [[id number]]  (Abnormal) Collected:  06/13/19 1539    Specimen:  Blood Updated:  06/13/19 1606     Glucose 261 mg/dL      Comment: : [id number] Magnus EuraisaMeter ID: [id number]       POC Glucose Once [[id number]]  (Normal) Collected:  06/13/19 1133     Specimen:  Blood Updated:  06/13/19 1144     Glucose 121 mg/dL      Comment: : [id number] Frankie Salazar) KaylieMeter ID: [id number]       Respiratory Culture - Sputum, ET Suction [[id number]] Collected:  06/11/19 2103    Specimen:  Sputum from ET Suction Updated:  06/13/19 1052     Respiratory Culture Light growth (2+) Normal Respiratory Alexandra     Gram Stain Less than 25 WBCs per low power field      Few (2+) Mixed gram positive alexandra      Few (2+) Epithelial cells seen    POC Glucose Once [[id number]]  (Normal) Collected:  06/13/19 0750    Specimen:  Blood Updated:  06/13/19 0802     Glucose 96 mg/dL      Comment: : [id number] Frankie Salazar) KaylieMeter ID: [id number]       POC Glucose Once [[id number]]  (Abnormal) Collected:  06/12/19 1934    Specimen:  Blood Updated:  06/12/19 1958     Glucose 134 mg/dL      Comment: : [id number] Magnus EuraisaMeter ID: [id number]       POC Glucose Once [[id number]]  (Abnormal) Collected:  06/12/19 1602    Specimen:  Blood Updated:  06/12/19 1613     Glucose 134 mg/dL      Comment: : [id number] Magnus EuraisaMeter ID: [id number]       POC Glucose Once [[id number]]  (Abnormal) Collected:  06/12/19 1254    Specimen:  Blood Updated:  06/12/19 1305     Glucose 183 mg/dL      Comment: : [id number] Frankie Salazar) KaylieMeter ID: [id number]       aPTT [[id number]]  (Normal) Collected:  06/12/19 0948    Specimen:  Blood Updated:  06/12/19 1004     PTT 33.8 seconds     POC Glucose Once [[id number]]  (Normal) Collected:  06/12/19 0708    Specimen:  Blood Updated:  06/12/19 0720     Glucose 101 mg/dL      Comment: : [id number] Frankie Salazar) KaylieMeter ID: [id number]       Comprehensive Metabolic Panel [[id number]]  (Abnormal) Collected:  06/12/19 0319    Specimen:  Blood Updated:  06/12/19 0513     Glucose 98 mg/dL      BUN 33 mg/dL      Creatinine 0.99 mg/dL      Sodium 140 mmol/L      Potassium 4.0 mmol/L      Chloride 108 mmol/L      CO2 27.0 mmol/L      Calcium  8.6 mg/dL      Total Protein 6.2 g/dL      Albumin 3.00 g/dL      ALT (SGPT) 29 U/L      AST (SGOT) 46 U/L      Alkaline Phosphatase 84 U/L      Total Bilirubin 0.8 mg/dL      eGFR Non African Amer 73 mL/min/1.73      Globulin 3.2 gm/dL      A/G Ratio 0.9 g/dL      BUN/Creatinine Ratio 33.3     Anion Gap 5.0 mmol/L     Narrative:       GFR Normal >60  Chronic Kidney Disease <60  Kidney Failure <15    Procalcitonin [[id number]]  (Normal) Collected:  06/12/19 0319    Specimen:  Blood Updated:  06/12/19 0506     Procalcitonin <0.25 ng/mL     Narrative:       SIRS, sepsis, severe sepsis, and septic shock are categorized according to the criteria of the consensus conference of the American College of Chest Physicians/Society of Critical Care Medicine.    PCT < 0.5 ng/mL     Systemic infection (sepsis) is not likely.    PCT >0.5 and < 2.0 ng/mL Systemic infection (sepsis) is possible, but other conditions are known to elevate PCT as well.    PCT > 2.0 ng/mL     Systemic infection (sepsis) is likely, unless other causes are known.      PCT > 10.0 ng/mL    Important systemic inflammatory response, almost exclusively due to severe bacterial sepsis or septic shock.    PCT values of < 0.5 ng/mL do not exclude an infection, because localized infections (without systemic signs) may be associated with such low concentrations, or a systemic infection in its initial stages (<6 hours).  Increased PCT can occur without infection.  PCT concentrations between 0.5 and 2.0 ng/mL should be interpreted taking into account the patients history.  It is recommended to retest PCT within 6-24 hours if any concentrations < 2.0 ng/mL are obtained.    Protime-INR [[id number]]  (Normal) Collected:  06/12/19 0319    Specimen:  Blood Updated:  06/12/19 0433     Protime 14.2 Seconds      INR 1.07    CBC (No Diff) [[id number]]  (Abnormal) Collected:  06/12/19 0319    Specimen:  Blood Updated:  06/12/19 0424     WBC 8.62 10*3/mm3      RBC 3.73 10*6/mm3       Hemoglobin 11.3 g/dL      Hematocrit 35.6 %      MCV 95.4 fL      MCH 30.3 pg      MCHC 31.7 g/dL      RDW 15.6 %      RDW-SD 54.4 fl      MPV 12.6 fL      Platelets 118 10*3/mm3     S. Pneumo Ag Urine or CSF - Urine, Urine, Clean Catch [[id number]]  (Normal) Collected:  06/12/19 0052    Specimen:  Urine, Clean Catch Updated:  06/12/19 0133     Strep Pneumo Ag Negative    Legionella Antigen, Urine - Urine, Urine, Clean Catch [[id number]]  (Normal) Collected:  06/12/19 0052    Specimen:  Urine, Clean Catch Updated:  06/12/19 0133     LEGIONELLA ANTIGEN, URINE Negative    POC Glucose Once [[id number]]  (Normal) Collected:  06/11/19 2045    Specimen:  Blood Updated:  06/11/19 2057     Glucose 122 mg/dL      Comment: : [id number] Alex CraigMeter ID: [id number]       POC Glucose Once [[id number]]  (Normal) Collected:  06/11/19 1559    Specimen:  Blood Updated:  06/11/19 1629     Glucose 116 mg/dL      Comment: : [id number] Khang NuñezndaMeter ID: [id number]       POC Glucose Once [[id number]]  (Normal) Collected:  06/11/19 1152    Specimen:  Blood Updated:  06/11/19 1325     Glucose 111 mg/dL      Comment: : [id number] Khang BrendaMeter ID: [id number]       Hemoglobin A1c [[id number]] Collected:  06/11/19 0604    Specimen:  Blood Updated:  06/11/19 0835     Hemoglobin A1C 6.3 %     Narrative:       Less than 6.0           Non-Diabetic Range  6.0-7.0                 ADA Therapeutic Target  Greater than 7.0        Action Suggested    Troponin [[id number]]  (Abnormal) Collected:  06/11/19 0604    Specimen:  Blood Updated:  06/11/19 0819     Troponin I 0.373 ng/mL     POC Glucose Once [[id number]]  (Normal) Collected:  06/11/19 0723    Specimen:  Blood Updated:  06/11/19 0808     Glucose 95 mg/dL      Comment: : [id number] Khang NuñezndaMeter ID: [id number]       Basic Metabolic Panel [[id number]]  (Abnormal) Collected:  06/11/19 0604    Specimen:  Blood Updated:  06/11/19 0806     Glucose 95 mg/dL      BUN 37 mg/dL       Creatinine 1.05 mg/dL      Sodium 140 mmol/L      Potassium 4.1 mmol/L      Chloride 107 mmol/L      CO2 27.0 mmol/L      Calcium 8.7 mg/dL      eGFR Non African Amer 68 mL/min/1.73      BUN/Creatinine Ratio 35.2     Anion Gap 6.0 mmol/L     Narrative:       GFR Normal >60  Chronic Kidney Disease <60  Kidney Failure <15    CBC (No Diff) [[id number]]  (Abnormal) Collected:  06/11/19 0604    Specimen:  Blood Updated:  06/11/19 0657     WBC 8.78 10*3/mm3      RBC 3.74 10*6/mm3      Hemoglobin 11.6 g/dL      Hematocrit 36.0 %      MCV 96.3 fL      MCH 31.0 pg      MCHC 32.2 g/dL      RDW 15.7 %      RDW-SD 54.4 fl      MPV 12.2 fL      Platelets 138 10*3/mm3     POC Glucose Once [[id number]]  (Abnormal) Collected:  06/10/19 2057    Specimen:  Blood Updated:  06/10/19 2126     Glucose 183 mg/dL      Comment: : [id number] Luciano Foote ID: [id number]       POC Glucose Once [[id number]]  (Abnormal) Collected:  06/10/19 2009    Specimen:  Blood Updated:  06/10/19 2035     Glucose 186 mg/dL      Comment: : [id number] Manjeet Andrew ID: [id number]       Troponin [[id number]]  (Abnormal) Collected:  06/10/19 1904    Specimen:  Blood Updated:  06/10/19 1955     Troponin I 0.464 ng/mL     Comprehensive Metabolic Panel [[id number]]  (Abnormal) Collected:  06/10/19 1221    Specimen:  Blood Updated:  06/10/19 1417     Glucose 118 mg/dL      BUN 38 mg/dL      Creatinine 1.11 mg/dL      Sodium 141 mmol/L      Potassium 4.1 mmol/L      Chloride 104 mmol/L      CO2 28.0 mmol/L      Calcium 9.3 mg/dL      Total Protein 7.3 g/dL      Albumin 3.60 g/dL      ALT (SGPT) 25 U/L      AST (SGOT) 47 U/L      Alkaline Phosphatase 85 U/L      Total Bilirubin 1.4 mg/dL      eGFR Non African Amer 64 mL/min/1.73      Globulin 3.7 gm/dL      A/G Ratio 1.0 g/dL      BUN/Creatinine Ratio 34.2     Anion Gap 9.0 mmol/L     Narrative:       GFR Normal >60  Chronic Kidney Disease <60  Kidney Failure <15    CBC & Differential [[id number]]  Collected:  06/10/19 1221    Specimen:  Blood Updated:  06/10/19 1356    Narrative:       The following orders were created for panel order CBC & Differential.  Procedure                               Abnormality         Status                     ---------                               -----------         ------                     CBC Auto Differential[[id number]]        Abnormal            Final result                 Please view results for these tests on the individual orders.    CBC Auto Differential [[id number]]  (Abnormal) Collected:  06/10/19 1221    Specimen:  Blood Updated:  06/10/19 1356     WBC 10.97 10*3/mm3      RBC 4.15 10*6/mm3      Hemoglobin 12.6 g/dL      Hematocrit 39.1 %      MCV 94.2 fL      MCH 30.4 pg      MCHC 32.2 g/dL      RDW 15.9 %      RDW-SD 54.9 fl      MPV 12.3 fL      Platelets 134 10*3/mm3      Neutrophil % 79.6 %      Lymphocyte % 9.1 %      Monocyte % 8.5 %      Eosinophil % 1.6 %      Basophil % 0.5 %      Immature Grans % 0.7 %      Neutrophils, Absolute 8.72 10*3/mm3      Lymphocytes, Absolute 1.00 10*3/mm3      Monocytes, Absolute 0.93 10*3/mm3      Eosinophils, Absolute 0.18 10*3/mm3      Basophils, Absolute 0.06 10*3/mm3      Immature Grans, Absolute 0.08 10*3/mm3      nRBC 0.0 /100 WBC     BNP [[id number]]  (Normal) Collected:  06/10/19 1221    Specimen:  Blood Updated:  06/10/19 1336     proBNP 1,320.0 pg/mL     Protime-INR [[id number]]  (Normal) Collected:  06/10/19 1221    Specimen:  Blood Updated:  06/10/19 1336     Protime 14.5 Seconds      INR 1.09    aPTT [[id number]]  (Normal) Collected:  06/10/19 1221    Specimen:  Blood Updated:  06/10/19 1336     PTT 34.8 seconds     [place] Draw [[id number]] Collected:  06/10/19 1221    Specimen:  Blood Updated:  06/10/19 1331    Narrative:       The following orders were created for panel order [place] Draw.  Procedure                               Abnormality         Status                     ---------                              "  -----------         ------                     Light Blue Top[[id number]]                                   Final result               Green Top (Gel)[[id number]]                                  Final result               Lavender Top[[id number]]                                     Final result               Red Top[[id number]]                                          Final result                 Please view results for these tests on the individual orders.    Light Blue Top [[id number]] Collected:  06/10/19 1221    Specimen:  Blood Updated:  06/10/19 1331     Extra Tube hold for add-on     Comment: Auto resulted       Green Top (Gel) [[id number]] Collected:  06/10/19 1221    Specimen:  Blood Updated:  06/10/19 1331     Extra Tube Hold for add-ons.     Comment: Auto resulted.       Lavender Top [[id number]] Collected:  06/10/19 1221    Specimen:  Blood Updated:  06/10/19 1331     Extra Tube hold for add-on     Comment: Auto resulted       Red Top [[id number]] Collected:  06/10/19 1221    Specimen:  Blood Updated:  06/10/19 1331     Extra Tube Hold for add-ons.     Comment: Auto resulted.       Troponin [[id number]]  (Abnormal) Collected:  06/10/19 1221    Specimen:  Blood Updated:  06/10/19 1303     Troponin I 0.402 ng/mL         Hospital Course:  The patient is a 78 y.o. male who presented to [facility] with weight loss and shortness of breath.    HPI 6/10/19:  \"Mr. Becerra is a 79 yo M with a past medical history of emphysema, DM, hypothyroidism, and a history of hypertension.  Patient has had over the last 3 weeks worsening chest pressure and loss of 9 lbs.  Patient has lost >15 lbs over the last 2-3 months.  Patient reports over the last week he has had nausea, vomiting, and early satiety and decrease oral intake.  Patient had a CT scan on 6/4 from his primary care doctor that show concern for right-lower lobe malignancy (daughter has the CD with images) and concern for adrenal metastasis and hilar lymph " "node metastasis.     Patient has developed worsening dyspnea, shortness of breath, and fatigue recently.  He previously was able to perform most of his ADLs and function quite well but since the symptoms started to worsen 3 weeks ago he cannot ambulate 100 feet without feeling short of breath.     Patient has a >60 pack-year smoking history.\"    Hospital Course:     Patient had a CT angiogram of the chest, it showed concern for malignancy in his right lower lobe as well as the mediastinal lymph nodes and a bilateral adrenal metastasis.  Patient also was noted to have a right lower lobe pneumonia.  Patient was treated with ceftriaxone and azithromycin while he was here, and he will be discharged on Omnicef.  Concern is that the patient has a postobstructive pneumonia that will not get better with antibiotics on his own.  There is an attempt to perform an IR guided biopsy of the right-sided adrenal metastasis.  However they could not find a window that was appropriate without going through his bases of his lungs.  Given the patient has metastasis below his diaphragm, he would automatically be stage IV requiring chemotherapy.  Patient has no interest in doing chemotherapy and would like to go home with hospice.  Hospice was arranged after the patient was stable.  Patient will be discharged with home O2 and a few more days of antibiotics as well as comfort medications.  Family was supportive of his decisions.    Results for orders placed during the hospital encounter of 06/10/19   Adult Transthoracic Echo Complete W/ Cont if Necessary Per Protocol    Narrative · Left ventricular systolic function is low normal. Estimated EF appears   to be in the range of 51 - 55%.  · Left ventricular diastolic dysfunction (grade I) consistent with   impaired relaxation.  · Trace mitral valve regurgitation is present.  · Trace tricuspid valve regurgitation is present. Estimated right   ventricular systolic pressure from tricuspid " "regurgitation is moderately   elevated (45-55 mmHg).         Palliative care was consulted and assisted with decisions.            Physical Exam on Discharge:  /83 (BP Location: Right arm, Patient Position: Sitting)   Pulse 86   Temp 97.6 °F (36.4 °C) (Oral)   Resp 16   Ht 182.9 cm (72\")   Wt 76.1 kg (167 lb 11.2 oz)   SpO2 90%   BMI 22.74 kg/m²   Physical Exam  Constitutional: He is oriented to person, place, and time. No distress.   HENT:   Head: Normocephalic and atraumatic.   Eyes: Conjunctivae are normal. No scleral icterus.   Neck: Neck supple. No JVD present.   Cardiovascular: Normal rate and regular rhythm.   Murmur heard.  Pulmonary/Chest: Effort normal. He has no wheezes  Coarse upper airway   Abdominal: Soft. Bowel sounds are normal. He exhibits no distension and no mass. There is no tenderness. There is no guarding.   Musculoskeletal: He exhibits no edema.   Neurological: He is alert and oriented to person, place, and time.   Skin: Skin is warm and dry. He is not diaphoretic. No erythema.   Psychiatric: He has a normal mood and affect. His behavior is normal.   Nursing note and vitals reviewed.        Condition on Discharge: Stable    Discharge Disposition:  Hospice/Home    Discharge Medications:     Discharge Medications      New Medications      Instructions Start Date   acetaminophen 325 MG tablet  Commonly known as:  TYLENOL   650 mg, Oral, Every 4 Hours PRN      ALPRAZolam 0.25 MG tablet  Commonly known as:  XANAX   0.25 mg, Oral, 2 Times Daily PRN      cefdinir 300 MG capsule  Commonly known as:  OMNICEF   300 mg, Oral, 2 Times Daily      HYDROcodone-acetaminophen 5-325 MG per tablet  Commonly known as:  NORCO   1 tablet, Oral, Every 4 Hours PRN      polyethylene glycol pack packet  Commonly known as:  MIRALAX   17 g, Oral, Daily   Start Date:  6/15/2019     predniSONE 20 MG tablet  Commonly known as:  DELTASONE   40 mg, Oral, Daily With Breakfast   Start Date:  6/15/2019      "   Continue These Medications      Instructions Start Date   carvedilol 12.5 MG tablet  Commonly known as:  COREG   12.5 mg, Oral, 2 Times Daily With Meals      levothyroxine 75 MCG tablet  Commonly known as:  SYNTHROID, LEVOTHROID   75 mcg, Oral, Daily      ondansetron 4 MG tablet  Commonly known as:  ZOFRAN   4 mg, Oral, Every 6 Hours PRN         Stop These Medications    allopurinol 300 MG tablet  Commonly known as:  ZYLOPRIM     atorvastatin 80 MG tablet  Commonly known as:  LIPITOR     indapamide 1.25 MG tablet  Commonly known as:  LOZOL     lisinopril 20 MG tablet  Commonly known as:  PRINIVIL,ZESTRIL     metFORMIN 500 MG tablet  Commonly known as:  GLUCOPHAGE          Discharge Diet:   Diet Instructions     Diet: Regular; Thin      Discharge Diet:  Regular    Fluid Consistency:  Thin        Activity at Discharge:   Activity Instructions     Activity as Tolerated            Follow-up Appointments:   No future appointments.    Test Results Pending at Discharge: None    Himanshu Grissom MD  06/14/19  3:58 PM    Time: 35 minutes.

## 2019-06-14 NOTE — PROGRESS NOTES
"Palliative Care Daily Progress Note     other Chart review    Code Status:   Code Status and Medical Interventions:   Ordered at: 06/12/19 6025     Limited Support to NOT Include:    Intubation    NIPPV (Non-Invasive Positive Pressure Support)     Level Of Support Discussed With:    Patient     Code Status:    No CPR     Medical Interventions (Level of Support Prior to Arrest):    Limited      Advanced Directives: Advance Directive Status: Patient does not have advance directive   Goals of Care: Ongoing.     S: Medical record reviewed. Events noted.  Plan to discharge home with hospice today.  Attending to complete the MOST form to further delineate care goals.  Discussed with , Merlina.    O:       Intake/Output Summary (Last 24 hours) at 6/14/2019 0902  Last data filed at 6/13/2019 1851  Gross per 24 hour   Intake 240 ml   Output 100 ml   Net 140 ml       Diagnostics: Reviewed    Patient Active Problem List   Diagnosis   • Pneumonia of right lower lobe due to infectious organism (CMS/HCC)   • Lung cancer (CMS/HCC), suspected, with metastasis to hilar lymph nodes and adrenal glands   • Type 2 diabetes mellitus (CMS/HCC)   • Weight loss   • Tobacco dependence   • Malnutrition (CMS/HCC)   • Moderate malnutrition (CMS/HCC)   • Anemia   • Diastolic dysfunction   • Acute respiratory failure with hypoxia (CMS/HCC), requiring nasal cannula       Physical Exam:    /80 (BP Location: Right arm, Patient Position: Lying)   Pulse 71   Temp 97.6 °F (36.4 °C) (Oral)   Resp 16   Ht 182.9 cm (72\")   Wt 76.1 kg (167 lb 11.2 oz)   SpO2 92%   BMI 22.74 kg/m²   Patient status: Disease state: Controlled with current treatments.  Functional status: Palliative Performance Scale Score: Performance 40% based on the following measures: Ambulation: Mainly in bed, Activity and Evidence of Disease: Unable to do any work, extensive evidence of disease, Self-Care: Mainly assistance required,  Intake: Normal or reduced, " LOC: Full, drowsy or confusion   ECOG Status(3) Capable of limited self-care, confined to bed or chair > 50% of waking hours.  Nutritional status: Albumin 3.0, greater than 25 pound weight loss, unintentional. Body mass index is 21.84 kg/m².      Family support: The patient receives support from his children..  POA/Healthcare surrogate-advance directive completed today     Impression/Problem List:     1.  COPD with emphysema  2.  Pulmonary fibrosis  3.  Suspected lung cancer with metastasis to hilar lymph nodes and bilateral adrenal glands ? liver lesions per CT abd., unable to complete adrenal biopsy.  4.  Pneumonia in the right lower lobe  5.  Unintentional weight loss, 30 pounds over last 1-1/2 to 2 months  6.  Impaired mobility  7.  Tobacco dependence  8.  Diabetes mellitus, type II     Recommendations/Plan:  1. plan: Goals of care include CODE STATUS no CPR\limited interventions.      2.  Palliative care encounter  -Reviewed and initiated medical orders for scope of treatment (MOST) form to further delineate care goals after discharge. Attending to complete the MOST form prior to discharge.  -Completed advanced directive  -Plan home with hospice today. Discussed with Lucy.     3.  Dyspnea  -Continue low-dose opioid hydrocodone\acetaminophen 5\325 mg every 4 hours as needed for air hunger, respirations greater than 24 breaths/min in patient with advanced COPD, pulmonary fibrosis, and lung cancer.  -antibiotics per attending-pneumonia     4.  Constipation  -Takes regular bowel regimen at home.  Continue MiraLAX daily     5.  Nausea  -Controlled      6. Anxiety  -Continue xanax 0.25 mg two times daily as needed anxiety/sleep.      Thank you for allowing us to participate in patient's plan of care. Palliative Care Team will follow as needed.     Time spent: 15 minutes spent reviewing medical and medication records, assessing and examining patient, discussing with family, answering questions,  providing some guidance about a plan and documentation of care, and coordinating care with other healthcare members, with > 50% time spent face to face.     Suad Grayson, APRN  6/14/2019

## 2019-06-15 LAB
BACTERIA SPEC AEROBE CULT: NORMAL
BACTERIA SPEC AEROBE CULT: NORMAL

## 2021-02-01 NOTE — ED NOTES
From: Donovan Bravo  To: SUKI Cabral - CNP  Sent: 1/30/2021 9:53 AM EST  Subject: Prescription Question    I tried to refill my Alprazolam using MyChart and it isn't listed. I am completely out. Would you please send a prescription for this to the [address] [place] [address]. on [employer] in [place]?     Thanks Pt states he had a MRI of his chest last week and they found multiple tumors. Pt is suppose to have a appt with  (pulmonologist) on 6/20 but states he has been so soa and weak that he didn't think he could make it that long. Pt also complains of lower back pain.     Fanta Baeza, RN  06/10/19 9653

## 2025-06-22 NOTE — PROGRESS NOTES
Continued Stay Note  RAKESH Briscoe     Patient Name: Jake Becerra  MRN: [id number]  Today's Date: 6/14/2019    Admit Date: 6/10/2019    Discharge Plan     Row Name 06/14/19 1113       Plan    Patient/Family in Agreement with Plan  yes    Final Discharge Disposition Code  50 - home with hospice    Final Note  SW spoke to Erin at [facility] again.  She stated she will call pt's daughter soon.  She is aware pt will dc today.  DEEPA Isaac.        Discharge Codes    No documentation.             DEEPA Macdonald     Discharged